# Patient Record
Sex: MALE | Race: WHITE | Employment: PART TIME | URBAN - METROPOLITAN AREA
[De-identification: names, ages, dates, MRNs, and addresses within clinical notes are randomized per-mention and may not be internally consistent; named-entity substitution may affect disease eponyms.]

---
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
## 2019-01-25 ENCOUNTER — DOCUMENTATION ONLY (OUTPATIENT)
Dept: PRIMARY CARE CLINIC | Age: 19
End: 2019-01-25

## 2019-01-25 NOTE — PROGRESS NOTES
22 y.o male, quiet, shy patient of Dr. Kaiden Clay presents to office as a walk-in, insisting on having his Concerta refilled. Patient admittedly has not attempted to have his medication refilled through routine means, phone call, My Chart, etc. Patient states, \"My Mom will strangle me if I don't come home with this. \" Review of last office visit with Dr. Bernardine Duverney reflects that patient was having some issues with medication compliance with Concerta and SSRI, and this was discussed and a plan made his last office visit to improve medication compliance. Discussed with MARY ELLEN Lainez and it was decided that it would be appropriate for patient to have an office visit with a Provider, prior to refilling medications. Advised patient of this and offered to schedule an appointment for Monday, 01/28/19 and patient is agreeable to this. Encouraged patient to request refills prior to the supply being exhausted, giving time for appointments, labs, etc. Patient verbalizes understanding. Encouraged patient to have Mom phone the office with questions or concerns. Patient is pleasant and cooperative. Left patient with PSR to schedule appointment. End of encounter.

## 2019-01-25 NOTE — TELEPHONE ENCOUNTER
Last office visit 12/19/2018  Last med refill has not had scripted from this office. Has not had since 2017. Attempted to contact yesenia at [phone] got mother. Mother gave different number to contact patient. [phone]. Attempted on that number and received message that voice mail box has not been set up yet.    Patient needs appointment before getting this medication

## 2019-01-28 ENCOUNTER — OFFICE VISIT (OUTPATIENT)
Dept: PRIMARY CARE CLINIC | Age: 19
End: 2019-01-28

## 2019-01-28 VITALS
DIASTOLIC BLOOD PRESSURE: 75 MMHG | HEART RATE: 70 BPM | SYSTOLIC BLOOD PRESSURE: 130 MMHG | OXYGEN SATURATION: 98 % | TEMPERATURE: 97.8 F | WEIGHT: 167.8 LBS | RESPIRATION RATE: 16 BRPM | HEIGHT: 73 IN | BODY MASS INDEX: 22.24 KG/M2

## 2019-01-28 DIAGNOSIS — F90.0 ATTENTION DEFICIT HYPERACTIVITY DISORDER (ADHD), PREDOMINANTLY INATTENTIVE TYPE: Primary | ICD-10-CM

## 2019-01-28 DIAGNOSIS — F41.9 ANXIETY AND DEPRESSION: ICD-10-CM

## 2019-01-28 DIAGNOSIS — F32.A ANXIETY AND DEPRESSION: ICD-10-CM

## 2019-01-28 RX ORDER — METHYLPHENIDATE HYDROCHLORIDE 54 MG/1
TABLET ORAL
Qty: 30 TAB | Refills: 0 | Status: SHIPPED | OUTPATIENT
Start: 2019-01-28

## 2019-01-28 NOTE — PROGRESS NOTES
Chief Complaint   Patient presents with    Medication Refill     states that the medication was working and was suppose to come a week ago but was sick.

## 2019-01-28 NOTE — PROGRESS NOTES
[facility]   [address], [address] [facility]  P: [phone]  F: [phone]      Chief Complaint   Patient presents with    Medication Refill     states that the medication was working and was suppose to come a week ago but was sick. Brian Conklin is a 22 y.o. male who presents to clinic for Medication Refill (states that the medication was working and was suppose to come a week ago but was sick. ). HPI:    The patient presents for medication refill for ADHD, hyperactivity type. He's been off methyphenidate for 10 days. He notes poor focus, hyper-activity and requests monthly refill of medication. He is taking Zoloft for anxiety and depression. His mood is stable. Denies SI or HI. Does not request a refill today. He is taking classes at [employer] but is now home. He is working on finding a job.      Patient Active Problem List    Diagnosis    Attention deficit hyperactivity disorder (ADHD), combined type        Past Medical History:   Diagnosis Date    ADHD     ADHD (attention deficit hyperactivity disorder)     Anxiety     Anxiety      Past Surgical History:   Procedure Laterality Date    HX OTHER SURGICAL  03/01/2018    cauterization of nose      Social History     Socioeconomic History    Marital status: SINGLE     Spouse name: Not on file    Number of children: Not on file    Years of education: Not on file    Highest education level: Not on file   Social Needs    Financial resource strain: Not on file    Food insecurity - worry: Not on file    Food insecurity - inability: Not on file   [employer] needs - medical: Not on file   [employer] needs - non-medical: Not on file   Occupational History    Not on file   Tobacco Use    Smoking status: Never Smoker    Smokeless tobacco: Never Used   Substance and Sexual Activity    Alcohol use: No     Frequency: Never    Drug use: No    Sexual activity: Not Currently     Birth control/protection: None   Other Topics Concern    Not on file   Social History Narrative    Not on file     History reviewed. No pertinent family history. No Known Allergies    Current Outpatient Medications   Medication Sig Dispense Refill    methylphenidate ER 54 mg 24 hr tab Take one tablet by mouth daily 30 Tab 0    sertraline (ZOLOFT) 100 mg tablet TAKE 1 TABLET BY MOUTH EVERY DAY 90 Tab 0    fexofenadine-pseudoephedrine (ALLEGRA-D 12 HOUR)  mg Tb12 Take 1 Tab by mouth every twelve (12) hours. The medications were reviewed and updated in the medical record. The past medical history, past surgical history, and family history were reviewed and updated in the medical record. REVIEW OF SYSTEMS   Review of Systems   Constitutional: Negative for malaise/fatigue. HENT: Negative for congestion. Eyes: Negative for blurred vision and pain. Respiratory: Negative for cough and shortness of breath. Cardiovascular: Negative for chest pain and palpitations. Gastrointestinal: Negative for abdominal pain and heartburn. Genitourinary: Negative for frequency and urgency. Musculoskeletal: Negative for joint pain and myalgias. Neurological: Negative for dizziness, tingling, sensory change, weakness and headaches. Psychiatric/Behavioral: Negative for depression, memory loss and substance abuse. PHYSICAL EXAM     Visit Vitals  /75 (BP 1 Location: Right arm, BP Patient Position: Sitting)   Pulse 70   Temp 97.8 °F (36.6 °C) (Oral)   Resp 16   Ht 6' 1\" (1.854 m)   Wt 167 lb 12.8 oz (76.1 kg)   SpO2 98%   BMI 22.14 kg/m²       Physical Exam   Constitutional: He is oriented to person, place, and time and well-developed, well-nourished, and in no distress. /75   HENT:   Head: Normocephalic and atraumatic. Right Ear: External ear normal.   Left Ear: External ear normal.   Cardiovascular: Normal rate, regular rhythm and normal heart sounds.    Pulmonary/Chest: Effort normal and breath sounds normal.   Musculoskeletal: Normal range of motion. He exhibits no edema. Neurological: He is alert and oriented to person, place, and time. Gait normal.   Skin: Skin is warm and dry. Acne to face   Psychiatric: Affect and judgment normal.   Nursing note and vitals reviewed. ASSESSMENT/ PLAN   Diagnoses and all orders for this visit:    Attention deficit hyperactivity disorder (ADHD), predominantly inattentive type  -     methylphenidate ER 54 mg 24 hr tab; Take one tablet by mouth daily, Print, Disp-30 Tab, R-0    Anxiety and depression       - Stable, continue on Zoloft. Follow-up Disposition:  Return in about 4 weeks (around 2/25/2019) for Chronic Care FU. Disclaimer:  Advised patient to call back or return to office if symptoms worsen/change/persist.  Discussed expected course/resolution/complications of diagnosis in detail with patient.     Medication risks/benefits/alternatives discussed with patient. Patient was given an after visit summary which includes diagnoses, current medications, & vitals.      Discussed patient instructions and advised to read to all patient instructions regarding care.      Patient expressed understanding with the diagnosis and plan. This note will not be viewable in [address]     Neelam Geiger NP  1/28/2019        (This document has been electronically signed)

## 2019-01-28 NOTE — PATIENT INSTRUCTIONS
Attention Deficit Hyperactivity Disorder (ADHD) in Adults: Care Instructions  Your Care Instructions    Attention deficit hyperactivity disorder, or ADHD, is a condition that makes it hard to pay attention. So you may have problems when you try to focus, get organized, and finish tasks. It might make you more active than other people. Or you might do things without thinking first.  ADHD is very common. It usually starts in early childhood. Many adults don't realize they have it until their children are diagnosed. Then they become aware of their own symptoms. Doctors don't know what causes ADHD. But it often runs in families. ADHD can be treated with medicines, behavior training, and counseling. Treatment can improve your life. Follow-up care is a key part of your treatment and safety. Be sure to make and go to all appointments, and call your doctor if you are having problems. It's also a good idea to know your test results and keep a list of the medicines you take. How can you care for yourself at home? · Learn all you can about ADHD. This will help you and your family understand it better. · Take your medicines exactly as prescribed. Call your doctor if you think you are having a problem with your medicine. You will get more details on the specific medicines your doctor prescribes. · If you miss a dose of your medicine, do not take an extra dose. · If your doctor suggests counseling, find a counselor you like and trust. Talk openly and honestly. Be willing to make some changes. · Find a support group for adults with ADHD. Talking to others with the same problems can help you feel better. It can also give you ideas about how to best cope with the condition. · Get rid of distractions at your work space. Keep your desk clean. Try not to face a window or busy hallway. · Use files, planners, and other tools to keep you organized. · Limit use of alcohol, and do not use illegal drugs.  People with ADHD tend to become addicted more easily than others. Tell your doctor if you need help to quit. Counseling, support groups, and sometimes medicines can help you stay free of alcohol or drugs. · Get at least 30 minutes of physical activity on most days of the week. Exercise has been shown to help people cope with ADHD. Walking is a good choice. You also may want to do other activities, such as running, swimming, cycling, or playing tennis or team sports. When should you call for help? Watch closely for changes in your health, and be sure to contact your doctor if:    · You feel sad a lot or cry all the time.     · You have trouble sleeping, or you sleep too much.     · You find it hard to concentrate, make decisions, or remember things.     · You change how you normally eat.     · You feel guilty for no reason. Where can you learn more? Go to http://christiano-сергей.info/. Enter B196 in the search box to learn more about \"Attention Deficit Hyperactivity Disorder (ADHD) in Adults: Care Instructions. \"  Current as of: September 11, 2018  Content Version: 11.9  © 4116-0516 [employer]. Care instructions adapted under license by [employer] (which disclaims liability or warranty for this information). If you have questions about a medical condition or this instruction, always ask your healthcare professional. [place] [address] any warranty or liability for your use of this information.

## 2019-03-14 RX ORDER — SERTRALINE HYDROCHLORIDE 50 MG/1
TABLET, FILM COATED ORAL
Qty: 90 TAB | Refills: 0 | Status: SHIPPED | OUTPATIENT
Start: 2019-03-14 | End: 2021-01-29 | Stop reason: ALTCHOICE

## 2020-01-22 ENCOUNTER — OFFICE VISIT (OUTPATIENT)
Dept: PRIMARY CARE CLINIC | Age: 20
End: 2020-01-22

## 2020-01-22 VITALS
RESPIRATION RATE: 16 BRPM | HEART RATE: 91 BPM | OXYGEN SATURATION: 97 % | HEIGHT: 73 IN | DIASTOLIC BLOOD PRESSURE: 79 MMHG | SYSTOLIC BLOOD PRESSURE: 112 MMHG | BODY MASS INDEX: 22.14 KG/M2

## 2020-01-22 DIAGNOSIS — R04.0 EPISTAXIS: Primary | ICD-10-CM

## 2020-01-22 RX ORDER — DULOXETIN HYDROCHLORIDE 20 MG/1
CAPSULE, DELAYED RELEASE ORAL
COMMUNITY
Start: 2020-01-14 | End: 2021-05-12 | Stop reason: ALTCHOICE

## 2020-01-22 NOTE — PROGRESS NOTES
Chief Complaint   Patient presents with    Epistaxis     started about 3 20 and mother states that it was sudden and alot. paitnet has had hx of nose bleeds and being carterised.

## 2020-01-22 NOTE — LETTER
NOTIFICATION OF RETURN TO WORK / SCHOOL 
 
1/22/2020 Mr. Kit Bunch 
[address] [address] To Whom It May Concern: 
 
Kit Bunch was under the care of [facility]. Please excuse him from work on 1/23/20. If there are questions or concerns please have the patient contact our office. Sincerely, Jaspreet Castle NP

## 2020-01-22 NOTE — PATIENT INSTRUCTIONS
Nosebleeds: Care Instructions  Your Care Instructions    Nosebleeds are common, especially if you have colds or allergies. Many things can cause a nosebleed. Some nosebleeds stop on their own with pressure. Others need packing. Some get cauterized (sealed). If you have gauze or other packing materials in your nose, you will need to follow up with your doctor to have the packing removed. You may need more treatment if you get nosebleeds a lot. The doctor has checked you carefully, but problems can develop later. If you notice any problems or new symptoms, get medical treatment right away. Follow-up care is a key part of your treatment and safety. Be sure to make and go to all appointments, and call your doctor if you are having problems. It's also a good idea to know your test results and keep a list of the medicines you take. How can you care for yourself at home? · If you get another nosebleed:  ? Sit up and tilt your head slightly forward. This keeps blood from going down your throat. ? Use your thumb and index finger to pinch your nose shut for 10 minutes. Use a clock. Do not check to see if the bleeding has stopped before the 10 minutes are up. If the bleeding has not stopped, pinch your nose shut for another 10 minutes. ? When the bleeding has stopped, try not to pick, rub, or blow your nose for 12 hours. Avoiding these things helps keep your nose from bleeding again. · If your doctor prescribed antibiotics, take them as directed. Do not stop taking them just because you feel better. You need to take the full course of antibiotics. To prevent nosebleeds  · Do not blow your nose too hard. · Try not to lift or strain after a nosebleed. · Raise your head on a pillow while you sleep. · Put a thin layer of a saline- or water-based nasal gel, such as NasoGel, inside your nose. Put it on the septum, which divides your nostrils. This will prevent dryness that can cause nosebleeds.   · Use a vaporizer or humidifier to add moisture to your bedroom. Follow the directions for cleaning the machine. · Do not use aspirin, ibuprofen (Advil, Motrin), or naproxen (Aleve) for 36 to 48 hours after a nosebleed unless your doctor tells you to. You can use acetaminophen (Tylenol) for pain relief. · Talk to your doctor about stopping any other medicines you are taking. Some medicines may make you more likely to get a nosebleed. · Do not use cold medicines or nasal sprays without first talking to your doctor. They can make your nose dry. When should you call for help? Call 911 anytime you think you may need emergency care. For example, call if:    · You passed out (lost consciousness).    Call your doctor now or seek immediate medical care if:    · You get another nosebleed and your nose is still bleeding after you have applied pressure 3 times for 10 minutes each time (30 minutes total).     · There is a lot of blood running down the back of your throat even after you pinch your nose and tilt your head forward.     · You have a fever.     · You have sinus pain.    Watch closely for changes in your health, and be sure to contact your doctor if:    · You get nosebleeds often, even if they stop.     · You do not get better as expected. Where can you learn more? Go to http://christiano-сергей.info/. Enter S156 in the search box to learn more about \"Nosebleeds: Care Instructions. \"  Current as of: June 26, 2019  Content Version: 12.2  © 7938-6096 [employer]. Care instructions adapted under license by [employer] (which disclaims liability or warranty for this information). If you have questions about a medical condition or this instruction, always ask your healthcare professional. [address] any warranty or liability for your use of this information.

## 2020-01-23 NOTE — PROGRESS NOTES
[facility]   [address], [address] [facility]  P: [phone]  F: [phone]      Chief Complaint   Patient presents with    Epistaxis     started about 3 20 and mother states that it was sudden and alot. jose has had hx of nose bleeds and being carterised. Em Weeks is a 20 y.o. male who presents to clinic for Epistaxis (started about 3 20 and mother states that it was sudden and alot. aartitcristina has had hx of nose bleeds and being carterised. ). HPI:    Nataliya Morales is a 71-year-old male who presents today for nosebleed. The bleeding has currently stopped. The patient states his nose was bleeding for roughly 1 hour. The patient has a history of prior nosebleeds, that required cauterization by ENT. The patient's mom states that the patient does blow his nose often, and forcefully. The patient does have a history of seasonal allergies. Patient denies any blood thinner use or trauma to the nose.     Patient Active Problem List    Diagnosis    Attention deficit hyperactivity disorder (ADHD), combined type          Past Medical History:   Diagnosis Date    ADHD     ADHD (attention deficit hyperactivity disorder)     Anxiety     Anxiety      Past Surgical History:   Procedure Laterality Date    HX OTHER SURGICAL  03/01/2018    cauterization of nose      Social History     Socioeconomic History    Marital status: SINGLE     Spouse name: Not on file    Number of children: Not on file    Years of education: Not on file    Highest education level: Not on file   Occupational History    Not on file   Social Needs    Financial resource strain: Not on file    Food insecurity:     Worry: Not on file     Inability: Not on file    Transportation needs:     Medical: Not on file     Non-medical: Not on file   Tobacco Use    Smoking status: Never Smoker    Smokeless tobacco: Never Used   Substance and Sexual Activity    Alcohol use: No     Frequency: Never    Drug use: No    Sexual activity: Not Currently     Birth control/protection: None   Lifestyle    Physical activity:     Days per week: Not on file     Minutes per session: Not on file    Stress: Not on file   Relationships    Social connections:     Talks on phone: Not on file     Gets together: Not on file     Attends Druze service: Not on file     Active member of club or organization: Not on file     Attends meetings of clubs or organizations: Not on file     Relationship status: Not on file    Intimate partner violence:     Fear of current or ex partner: Not on file     Emotionally abused: Not on file     Physically abused: Not on file     Forced sexual activity: Not on file   Other Topics Concern    Not on file   Social History Narrative    Not on file     History reviewed. No pertinent family history. No Known Allergies    Current Outpatient Medications   Medication Sig Dispense Refill    sodium chloride-sod bicarb-hyaluronate sodium-aloe 0.9 % nasal gel Apply  to affected area two (2) times a day. 30 g 0    DULoxetine (CYMBALTA) 20 mg capsule       methylphenidate ER 54 mg 24 hr tab Take one tablet by mouth daily 30 Tab 0    sertraline (ZOLOFT) 100 mg tablet TAKE 1 TABLET BY MOUTH EVERY DAY 90 Tab 0    sertraline (ZOLOFT) 50 mg tablet TAKE 1 TABLET BY MOUTH EVERY DAY 90 Tab 0    fexofenadine-pseudoephedrine (ALLEGRA-D 12 HOUR)  mg Tb12 Take 1 Tab by mouth every twelve (12) hours. The medications were reviewed and updated in the medical record. The past medical history, past surgical history, and family history were reviewed and updated in the medical record. REVIEW OF SYSTEMS   Review of Systems   HENT:        +epistaxis    All other systems reviewed and are negative.         PHYSICAL EXAM     Visit Vitals  /79 (BP 1 Location: Left arm, BP Patient Position: Sitting)   Pulse 91   Resp 16   Ht 6' 1\" (1.854 m)   SpO2 97%   BMI 22.14 kg/m²       Physical Exam  Vitals signs and nursing note reviewed. HENT:      Head: Normocephalic and atraumatic. Right Ear: External ear normal.      Left Ear: External ear normal.      Nose:      Right Nostril: Epistaxis (hemostasis acheived ) present. Left Nostril: No epistaxis. Cardiovascular:      Rate and Rhythm: Normal rate and regular rhythm. Heart sounds: Normal heart sounds. Pulmonary:      Effort: Pulmonary effort is normal.      Breath sounds: Normal breath sounds. Musculoskeletal: Normal range of motion. Skin:     General: Skin is warm and dry. Neurological:      Mental Status: He is alert and oriented to person, place, and time. Gait: Gait is intact. Psychiatric:         Mood and Affect: Affect normal.         Judgment: Judgment normal.       ASSESSMENT/ PLAN   Diagnoses and all orders for this visit:    1. Epistaxis  -     sodium chloride-sod bicarb-hyaluronate sodium-aloe 0.9 % nasal gel; Apply  to affected area two (2) times a day. -Provided AVS instructions for nosebleeds. Advised patient to not blow his nose forcefully, not to use aspirin/NSAIDs for 2 days, should not lift heavy objects for the next 24 hours. Provided work note for patient.  -If becoming a recurrent issue should follow-up with ENT for possible cauterization. Disclaimer:  Advised patient to call back or return to office if symptoms worsen/change/persist.  Discussed expected course/resolution/complications of diagnosis in detail with patient.     Medication risks/benefits/alternatives discussed with patient. Patient was given an after visit summary which includes diagnoses, current medications, & vitals.      Discussed patient instructions and advised to read to all patient instructions regarding care.      Patient expressed understanding with the diagnosis and plan. This note will not be viewable in [address]         Allyson Wasserman NP  1/22/2020        (This document has been electronically signed)

## 2020-02-24 NOTE — TELEPHONE ENCOUNTER
LOV 07/25/18  Last refill 07/01/18 This is an already known fracture, required 1 week repeat xray per ortho during recent hospitalization   She is using immobilizer and has appt with ortho in 2 days Verified Results  (1) HEP B CORE AB, IgM [id number] 07:26AM Kiersten Santos Order Number: [id number]     Test Name Result Flag Reference   HEPATITIS B CORE IGM ANTIBODY Non-reactive  Non-reactive     (1) HEP B SURFACE ANTIGEN 07Sep2016 07:26AM Kiersten Santos Order Number: [id number]     Test Name Result Flag Reference   HEPATITIS B SURFACE ANTIGEN Non-reactive  Non-reactive, NonReactive - Confirmed     (1) HEP C ANTIBODY 07Sep2016 07:26AM Kiersten Santos Order Number: [id number]     Test Name Result Flag Reference   HEPATITIS C ANTIBODY Non-reactive  Non-reactive

## 2020-05-29 ENCOUNTER — VIRTUAL VISIT (OUTPATIENT)
Dept: PRIMARY CARE CLINIC | Age: 20
End: 2020-05-29

## 2020-05-29 DIAGNOSIS — J06.9 VIRAL UPPER RESPIRATORY TRACT INFECTION: Primary | ICD-10-CM

## 2020-05-29 DIAGNOSIS — M79.10 MYALGIA: ICD-10-CM

## 2020-05-29 NOTE — PROGRESS NOTES
I was in the office while conducting this encounter. Consent:  He and/or his healthcare decision maker is aware that this patient-initiated Telehealth encounter is a billable service, with coverage as determined by his insurance carrier. He is aware that he may receive a bill and has provided verbal consent to proceed: Yes    This virtual visit was conducted via [employer]. Pursuant to the emergency declaration under the [facility] [place] [address] waiver authority and the [employer] Act, this Virtual  Visit was conducted to reduce the patient's risk of exposure to COVID-19 and provide continuity of care for an established patient. Services were provided through a video synchronous discussion virtually to substitute for in-person clinic visit. Due to this being a TeleHealth evaluation, many elements of the physical examination are unable to be assessed. HPI:   Aviva Fowler is a 20y.o. year old male who presents today for  body aches & fatigue for last 1 week. Cough for last 2 days. He works at [employer]. No one around him was sick. He took Robitussin & Ibuprofen which helped. No fever but has night sweats. Has been having trouble sleeping which has been getting worse. Feeling so weak and can`t get up from the bed.    .    Review of Systems   Constitutional: positive for chills and malaise, negative for fevers  Eyes: negative for visual disturbance, redness and icterus  Ears, nose, mouth, throat, and face: negative for ear drainage and sore mouth  Respiratory: positive for cough, negative for wheezing or dyspnea on exertion  Cardiovascular: negative for chest pain, palpitations  Musculoskeletal:positive for myalgias, negative for neck pain and back pain  Neurological: negative for dizziness and vertigo  Behavioral/Psych: negative for aggressive behavior and anxiety    Physical Examination   General appearance: alert, cooperative, no distress, appears stated age  Eyes:  conjunctivae and sclerae normal  Throat: Lips, mucosa, and tongue normal. Teeth and gums normal  Lungs: No distress , effort normal   Extremities: No cyanosis or edema. Assessment/Plan   Diagnoses and all orders for this visit:    Viral upper respiratory tract infection  Told him it is just viral infection , should stay hydrated & eat healthy. He is interested getting COVID test done. Numbers given to get it checked. Myalgia  Tylenol as needed recommended .        Avinash Couch MD  5/29/2020  12:29 PM

## 2020-08-07 ENCOUNTER — OFFICE VISIT (OUTPATIENT)
Dept: PRIMARY CARE CLINIC | Age: 20
End: 2020-08-07
Payer: COMMERCIAL

## 2020-08-07 VITALS
DIASTOLIC BLOOD PRESSURE: 76 MMHG | RESPIRATION RATE: 18 BRPM | SYSTOLIC BLOOD PRESSURE: 111 MMHG | BODY MASS INDEX: 29.16 KG/M2 | TEMPERATURE: 97.3 F | HEART RATE: 88 BPM | OXYGEN SATURATION: 95 % | WEIGHT: 220 LBS | HEIGHT: 73 IN

## 2020-08-07 DIAGNOSIS — L03.012 PARONYCHIA OF FINGER OF LEFT HAND: Primary | ICD-10-CM

## 2020-08-07 DIAGNOSIS — Z23 NEED FOR DIPHTHERIA-TETANUS-PERTUSSIS (TDAP) VACCINE: ICD-10-CM

## 2020-08-07 DIAGNOSIS — S69.92XA INJURY OF FINGER OF LEFT HAND, INITIAL ENCOUNTER: ICD-10-CM

## 2020-08-07 PROCEDURE — 99213 OFFICE O/P EST LOW 20 MIN: CPT | Performed by: INTERNAL MEDICINE

## 2020-08-07 PROCEDURE — 90471 IMMUNIZATION ADMIN: CPT | Performed by: INTERNAL MEDICINE

## 2020-08-07 PROCEDURE — 90715 TDAP VACCINE 7 YRS/> IM: CPT

## 2020-08-07 RX ORDER — CLINDAMYCIN HYDROCHLORIDE 300 MG/1
300 CAPSULE ORAL 3 TIMES DAILY
Qty: 30 CAP | Refills: 0 | Status: SHIPPED | OUTPATIENT
Start: 2020-08-07 | End: 2020-08-17

## 2020-08-07 NOTE — PROGRESS NOTES
Chief Complaint   Patient presents with    Laceration     left ring finger approx 2weeks ago.  non healing

## 2020-08-07 NOTE — PROGRESS NOTES
Written by Mya Goldberg, as dictated by Dr. Maryjane English MD.    Vanessa Batres is a 20 y.o. male. HPI  Pt presents today to discuss a laceration on the 4th finger of his L hand. He does not remember what he did to cut his finger, but it occurred about 2 weeks ago. He thought it will get better but it`s getting worse. Has noticed swelling & pain on touch is getting worse. His last Tdap was in 2010. He currently works at [employer]. Patient Active Problem List   Diagnosis Code    Attention deficit hyperactivity disorder (ADHD), combined type F90.2        Current Outpatient Medications on File Prior to Visit   Medication Sig Dispense Refill    DULoxetine (CYMBALTA) 20 mg capsule       sertraline (ZOLOFT) 100 mg tablet TAKE 1 TABLET BY MOUTH EVERY DAY 90 Tab 0    fexofenadine-pseudoephedrine (ALLEGRA-D 12 HOUR)  mg Tb12 Take 1 Tab by mouth every twelve (12) hours.  sodium chloride-sod bicarb-hyaluronate sodium-aloe 0.9 % nasal gel Apply  to affected area two (2) times a day. 30 g 0    sertraline (ZOLOFT) 50 mg tablet TAKE 1 TABLET BY MOUTH EVERY DAY 90 Tab 0    methylphenidate ER 54 mg 24 hr tab Take one tablet by mouth daily 30 Tab 0     No current facility-administered medications on file prior to visit. No Known Allergies    Past Medical History:   Diagnosis Date    ADHD     ADHD (attention deficit hyperactivity disorder)     Anxiety     Anxiety        Past Surgical History:   Procedure Laterality Date    HX OTHER SURGICAL  03/01/2018    cauterization of nose        No family history on file.     Social History     Socioeconomic History    Marital status: SINGLE     Spouse name: Not on file    Number of children: Not on file    Years of education: Not on file    Highest education level: Not on file   Occupational History    Not on file   Social Needs    Financial resource strain: Not on file    Food insecurity     Worry: Not on file Inability: Not on file    Transportation needs     Medical: Not on file     Non-medical: Not on file   Tobacco Use    Smoking status: Never Smoker    Smokeless tobacco: Never Used   Substance and Sexual Activity    Alcohol use: No     Frequency: Never    Drug use: No    Sexual activity: Not Currently     Birth control/protection: None   Lifestyle    Physical activity     Days per week: Not on file     Minutes per session: Not on file    Stress: Not on file   Relationships    Social connections     Talks on phone: Not on file     Gets together: Not on file     Attends Rastafari service: Not on file     Active member of club or organization: Not on file     Attends meetings of clubs or organizations: Not on file     Relationship status: Not on file    Intimate partner violence     Fear of current or ex partner: Not on file     Emotionally abused: Not on file     Physically abused: Not on file     Forced sexual activity: Not on file   Other Topics Concern    Not on file   Social History Narrative    Not on file       No visits with results within 3 Month(s) from this visit.    Latest known visit with results is:   Admission on 11/18/2017, Discharged on 11/18/2017   Component Date Value Ref Range Status    Ventricular Rate 11/18/2017 102  BPM Final    Atrial Rate 11/18/2017 102  BPM Final    P-R Interval 11/18/2017 152  ms Final    QRS Duration 11/18/2017 90  ms Final    Q-T Interval 11/18/2017 322  ms Final    QTC Calculation (Bezet) 11/18/2017 419  ms Final    Calculated P Axis 11/18/2017 55  degrees Final    Calculated R Axis 11/18/2017 79  degrees Final    Calculated T Axis 11/18/2017 37  degrees Final    Diagnosis 11/18/2017    Final                    Value:Sinus tachycardia  When compared with ECG of 12-JUL-2010 09:52,  Rate is increased, otherwise no significant change  Confirmed by Rodney Hennessy M.D., Angela Dietrich ([id number]) on 11/19/2017 3:06:36 PM      Sodium 11/18/2017 141  136 - 145 mmol/L Final    Potassium 11/18/2017 3.6  3.5 - 5.1 mmol/L Final    Chloride 11/18/2017 107  97 - 108 mmol/L Final    CO2 11/18/2017 29  21 - 32 mmol/L Final    Anion gap 11/18/2017 5  5 - 15 mmol/L Final    Glucose 11/18/2017 75  65 - 100 mg/dL Final    BUN 11/18/2017 16  6 - 20 MG/DL Final    Creatinine 11/18/2017 0.90  0.70 - 1.30 MG/DL Final    BUN/Creatinine ratio 11/18/2017 18  12 - 20   Final    GFR est AA 11/18/2017 >60  >60 ml/min/1.73m2 Final    GFR est non-AA 11/18/2017 >60  >60 ml/min/1.73m2 Final    Comment: Estimated GFR is calculated using the IDMS-traceable Modification of Diet in Renal Disease (MDRD) Study equation, reported for both  Americans (GFRAA) and non- Americans (GFRNA), and normalized to 1.73m2 body surface area. The physician must decide which value applies to the patient. The MDRD study equation should only be used in individuals age 25 or older. It has not been validated for the following: pregnant women, patients with serious comorbid conditions, or on certain medications, or persons with extremes of body size, muscle mass, or nutritional status.  Calcium 11/18/2017 9.4  8.5 - 10.1 MG/DL Final    Bilirubin, total 11/18/2017 0.3  0.2 - 1.0 MG/DL Final    ALT (SGPT) 11/18/2017 23  12 - 78 U/L Final    AST (SGOT) 11/18/2017 10* 15 - 37 U/L Final    Alk. phosphatase 11/18/2017 101  60 - 330 U/L Final    Protein, total 11/18/2017 7.4  6.4 - 8.2 g/dL Final    Albumin 11/18/2017 4.1  3.5 - 5.0 g/dL Final    Globulin 11/18/2017 3.3  2.0 - 4.0 g/dL Final    A-G Ratio 11/18/2017 1.2  1.1 - 2.2   Final    CK 11/18/2017 127  39 - 308 U/L Final    Acetaminophen level 11/18/2017 <2* 10 - 30 ug/mL Final    Acetaminophen greater than 150 µg/mL at 4 hours after ingestion and 50 µg/mL at 12 hours after ingestion is often associated with toxic reactions.     Salicylate level 47/83/1931 <1.7* 2.8 - 20.0 MG/DL Final    Comment: Analgesic: 2-10 mg/dL  Anti-inflammatory: 10-30 mg/dl  Toxic: >30 mg/dl      ALCOHOL(ETHYL),SERUM 11/18/2017 <10  <10 MG/DL Final    Color 11/18/2017 YELLOW/STRAW    Final    Color Reference Range: Straw, Yellow or Dark Yellow    Appearance 11/18/2017 CLOUDY* CLEAR   Final    Specific gravity 11/18/2017 1.019  1.003 - 1.030   Final    pH (UA) 11/18/2017 7.5  5.0 - 8.0   Final    Protein 11/18/2017 NEGATIVE   NEG mg/dL Final    Glucose 11/18/2017 NEGATIVE   NEG mg/dL Final    Ketone 11/18/2017 NEGATIVE   NEG mg/dL Final    Bilirubin 11/18/2017 NEGATIVE   NEG   Final    Blood 11/18/2017 NEGATIVE   NEG   Final    Urobilinogen 11/18/2017 0.2  0.2 - 1.0 EU/dL Final    Nitrites 11/18/2017 NEGATIVE   NEG   Final    Leukocyte Esterase 11/18/2017 NEGATIVE   NEG   Final    AMPHETAMINES 11/18/2017 NEGATIVE   NEG   Final    BARBITURATES 11/18/2017 NEGATIVE   NEG   Final    BENZODIAZEPINES 11/18/2017 NEGATIVE   NEG   Final    COCAINE 11/18/2017 NEGATIVE   NEG   Final    METHADONE 11/18/2017 NEGATIVE   NEG   Final    OPIATES 11/18/2017 NEGATIVE   NEG   Final    PCP(PHENCYCLIDINE) 11/18/2017 NEGATIVE   NEG   Final    THC (TH-CANNABINOL) 11/18/2017 NEGATIVE   NEG   Final    Drug screen comment 11/18/2017 (NOTE)   Final    Comment: This test is a screen for drugs of abuse in a medical setting only  (i.e., they are unconfirmed results and as such must not be used for  non-medical purposes e.g., employment testing, legal testing). Due to its inherent nature, false positive (FP) and false negative (FN)  results may be obtained. Therefore, if necessary for medical care,  recommend confirmation of positive findings by GC/MS.     The cutoff values (i.e., the level at which this screening test  becomes positive for a given drug group) are:    Amphetamine/Methamphetamine: 300 ng/mL  Barbiturates:                200 ng/mL  Benzodiazepines:             200 ng/mL  Cocaine:                     150 ng/mL  Methadone:                   300 ng/mL  Opiates: 300 ng/mL  Phencyclidine, PCP:           25 ng/mL  Marijuana, THC:               50 ng/mL    This screening test can identify the presence of the following drugs  when above the cutoff value: See list posted on the intranet. It can  be viewed by selecting i                           n sequence the following: From the [employer]IS home  page, select: Shannan; Sharon Montes De Oca; Laboratory Department;  MARIAH Valverde; Laboratory Directory of Services; then List of  Detectable Drugs for Automated Urine Drug Screen. Review of Systems   Constitutional: Negative for malaise/fatigue and weight loss. HENT: Negative for congestion and sore throat. Eyes: Negative for blurred vision. Respiratory: Negative for cough and shortness of breath. Cardiovascular: Negative for chest pain and leg swelling. Gastrointestinal: Negative for constipation and heartburn. Genitourinary: Negative for frequency and urgency. Musculoskeletal: Positive for joint pain (L 4th finger). Negative for back pain and myalgias. Skin:        +laceration on 4th finger of L hand   Neurological: Negative for dizziness and headaches. Psychiatric/Behavioral: Negative for depression. The patient is not nervous/anxious and does not have insomnia. Visit Vitals  /76 (BP 1 Location: Left arm, BP Patient Position: Sitting)   Pulse 88   Temp 97.3 °F (36.3 °C) (Temporal)   Resp 18   Ht 6' 1\" (1.854 m)   Wt 220 lb (99.8 kg)   SpO2 95%   BMI 29.03 kg/m²     Physical Exam  Vitals signs and nursing note reviewed. Constitutional:       General: He is not in acute distress. Appearance: He is well-developed and well-groomed. He is not diaphoretic. HENT:      Right Ear: External ear normal.      Left Ear: External ear normal.   Eyes:      General: No scleral icterus. Right eye: No discharge. Left eye: No discharge. Extraocular Movements: Extraocular movements intact.       Conjunctiva/sclera: Conjunctivae normal.   Neck:      Musculoskeletal: Normal range of motion and neck supple. Cardiovascular:      Rate and Rhythm: Normal rate and regular rhythm. Pulmonary:      Effort: Pulmonary effort is normal.      Breath sounds: Normal breath sounds. No wheezing. Abdominal:      General: Bowel sounds are normal.      Palpations: Abdomen is soft. Tenderness: There is no abdominal tenderness. Musculoskeletal:        Hands:    Lymphadenopathy:      Cervical: No cervical adenopathy. Skin:     Findings: Laceration (beside fingernail; 4th finger L hand; yellow but not oozing) present. Neurological:      Mental Status: He is alert and oriented to person, place, and time. Psychiatric:         Mood and Affect: Mood and affect normal.         Behavior: Behavior normal.       ASSESSMENT and PLAN    ICD-10-CM ICD-9-CM    1. Paronychia of finger of left hand  L03.012 681.02 clindamycin (CLEOCIN) 300 mg capsule sent to pharmacy. Potential side effects were discussed. I prescribed clindamycin and instructed pt to take it TID with food and to start taking a probiotic every day. He should contact the office if he is having digestive problems from this medication. 2. Injury of finger of left hand, initial encounter  S69. 92XA 959.5 Told him should get Tetanus shot as his is  . 3. Need for diphtheria-tetanus-pertussis (Tdap) vaccine  Z23 V06.1 TETANUS, DIPHTHERIA TOXOIDS AND ACELLULAR PERTUSSIS VACCINE (TDAP), IN INDIVIDS. >=7, IM administered in office. Pt tolerated it well. This plan was reviewed with the patient and patient agrees. All questions were answered. This scribe documentation was reviewed by me and accurately reflects the examination and decisions made by me. This note will not be viewable in [address]

## 2021-01-29 ENCOUNTER — OFFICE VISIT (OUTPATIENT)
Dept: PRIMARY CARE CLINIC | Age: 21
End: 2021-01-29
Payer: COMMERCIAL

## 2021-01-29 VITALS
WEIGHT: 232.8 LBS | BODY MASS INDEX: 30.85 KG/M2 | HEART RATE: 81 BPM | HEIGHT: 73 IN | TEMPERATURE: 98.4 F | SYSTOLIC BLOOD PRESSURE: 116 MMHG | OXYGEN SATURATION: 96 % | RESPIRATION RATE: 16 BRPM | DIASTOLIC BLOOD PRESSURE: 79 MMHG

## 2021-01-29 DIAGNOSIS — F41.9 ANXIETY: ICD-10-CM

## 2021-01-29 DIAGNOSIS — R11.0 NAUSEA: Primary | ICD-10-CM

## 2021-01-29 DIAGNOSIS — K58.2 IRRITABLE BOWEL SYNDROME WITH BOTH CONSTIPATION AND DIARRHEA: ICD-10-CM

## 2021-01-29 DIAGNOSIS — K21.9 GASTROESOPHAGEAL REFLUX DISEASE WITHOUT ESOPHAGITIS: ICD-10-CM

## 2021-01-29 PROCEDURE — 99213 OFFICE O/P EST LOW 20 MIN: CPT | Performed by: INTERNAL MEDICINE

## 2021-01-29 RX ORDER — DICYCLOMINE HYDROCHLORIDE 10 MG/1
10 CAPSULE ORAL 3 TIMES DAILY
Qty: 45 CAP | Refills: 0 | Status: SHIPPED | OUTPATIENT
Start: 2021-01-29 | End: 2021-02-13

## 2021-01-29 NOTE — PROGRESS NOTES
Written by Jordon Casarez, as dictated by Dr. Laila Ennis MD.    Ulises Anderson (: 2000) is a 23 y.o. male, established patient, here for evaluation of the following chief complaint(s): Other (nausea is spiked yesterday more than usual and it feels like a pit in the stomach and felt like felt like it was doing flips. took pepto bismal and karopectate)     SUBJECTIVE/OBJECTIVE:  HPI  Pt presents today to request a referral to gastroenterology. He has had digestive issues all his life, but recently he has had a lot of nausea and this inspired him to start pursuing care from a specialist. He also gets intermittent diarrhea and constipation. He can keep food down, but he feels like his stomach is \"constantly doing flips\" and it can become very painful. Yesterday he had to leave work because of his sx. He is also belching and struggling with acid reflux. He takes omeprazole for this but it does not help very much. He is doing intensive in-home therapy for his anxiety and continues on Cymbalta and methylphenidate ER every day. Patient Active Problem List   Diagnosis Code    Attention deficit hyperactivity disorder (ADHD), combined type F90.2        Current Outpatient Medications on File Prior to Visit   Medication Sig Dispense Refill    sodium chloride-sod bicarb-hyaluronate sodium-aloe 0.9 % nasal gel Apply  to affected area two (2) times a day. 30 g 0    DULoxetine (CYMBALTA) 20 mg capsule       methylphenidate ER 54 mg 24 hr tab Take one tablet by mouth daily 30 Tab 0    [DISCONTINUED] sertraline (ZOLOFT) 50 mg tablet TAKE 1 TABLET BY MOUTH EVERY DAY 90 Tab 0    [DISCONTINUED] sertraline (ZOLOFT) 100 mg tablet TAKE 1 TABLET BY MOUTH EVERY DAY 90 Tab 0    fexofenadine-pseudoephedrine (ALLEGRA-D 12 HOUR)  mg Tb12 Take 1 Tab by mouth every twelve (12) hours. No current facility-administered medications on file prior to visit.         No Known Allergies    Past Medical History: Diagnosis Date    ADHD     ADHD (attention deficit hyperactivity disorder)     Anxiety     Anxiety        Past Surgical History:   Procedure Laterality Date    HX OTHER SURGICAL  03/01/2018    cauterization of nose        History reviewed. No pertinent family history. Social History     Socioeconomic History    Marital status: SINGLE     Spouse name: Not on file    Number of children: Not on file    Years of education: Not on file    Highest education level: Not on file   Occupational History    Not on file   Social Needs    Financial resource strain: Not on file    Food insecurity     Worry: Not on file     Inability: Not on file    Transportation needs     Medical: Not on file     Non-medical: Not on file   Tobacco Use    Smoking status: Never Smoker    Smokeless tobacco: Never Used   Substance and Sexual Activity    Alcohol use: No     Frequency: Never    Drug use: No    Sexual activity: Not Currently     Birth control/protection: None   Lifestyle    Physical activity     Days per week: Not on file     Minutes per session: Not on file    Stress: Not on file   Relationships    Social connections     Talks on phone: Not on file     Gets together: Not on file     Attends Mosque service: Not on file     Active member of club or organization: Not on file     Attends meetings of clubs or organizations: Not on file     Relationship status: Not on file    Intimate partner violence     Fear of current or ex partner: Not on file     Emotionally abused: Not on file     Physically abused: Not on file     Forced sexual activity: Not on file   Other Topics Concern    Not on file   Social History Narrative    Not on file       No visits with results within 3 Month(s) from this visit.    Latest known visit with results is:   Admission on 11/18/2017, Discharged on 11/18/2017   Component Date Value Ref Range Status    Ventricular Rate 11/18/2017 102  BPM Final    Atrial Rate 11/18/2017 102  BPM Final    P-R Interval 11/18/2017 152  ms Final    QRS Duration 11/18/2017 90  ms Final    Q-T Interval 11/18/2017 322  ms Final    QTC Calculation (Bezet) 11/18/2017 419  ms Final    Calculated P Axis 11/18/2017 55  degrees Final    Calculated R Axis 11/18/2017 79  degrees Final    Calculated T Axis 11/18/2017 37  degrees Final    Diagnosis 11/18/2017    Final                    Value:Sinus tachycardia  When compared with ECG of 12-JUL-2010 09:52,  Rate is increased, otherwise no significant change  Confirmed by Jenna Del Cid M.D., [place] ([id number]) on 11/19/2017 3:06:36 PM      Sodium 11/18/2017 141  136 - 145 mmol/L Final    Potassium 11/18/2017 3.6  3.5 - 5.1 mmol/L Final    Chloride 11/18/2017 107  97 - 108 mmol/L Final    CO2 11/18/2017 29  21 - 32 mmol/L Final    Anion gap 11/18/2017 5  5 - 15 mmol/L Final    Glucose 11/18/2017 75  65 - 100 mg/dL Final    BUN 11/18/2017 16  6 - 20 MG/DL Final    Creatinine 11/18/2017 0.90  0.70 - 1.30 MG/DL Final    BUN/Creatinine ratio 11/18/2017 18  12 - 20   Final    GFR est AA 11/18/2017 >60  >60 ml/min/1.73m2 Final    GFR est non-AA 11/18/2017 >60  >60 ml/min/1.73m2 Final    Comment: Estimated GFR is calculated using the IDMS-traceable Modification of Diet in Renal Disease (MDRD) Study equation, reported for both  Americans (GFRAA) and non- Americans (GFRNA), and normalized to 1.73m2 body surface area. The physician must decide which value applies to the patient. The MDRD study equation should only be used in individuals age 25 or older. It has not been validated for the following: pregnant women, patients with serious comorbid conditions, or on certain medications, or persons with extremes of body size, muscle mass, or nutritional status.       Calcium 11/18/2017 9.4  8.5 - 10.1 MG/DL Final    Bilirubin, total 11/18/2017 0.3  0.2 - 1.0 MG/DL Final    ALT (SGPT) 11/18/2017 23  12 - 78 U/L Final    AST (SGOT) 11/18/2017 10* 15 - 37 U/L Final    Alk. phosphatase 11/18/2017 101  60 - 330 U/L Final    Protein, total 11/18/2017 7.4  6.4 - 8.2 g/dL Final    Albumin 11/18/2017 4.1  3.5 - 5.0 g/dL Final    Globulin 11/18/2017 3.3  2.0 - 4.0 g/dL Final    A-G Ratio 11/18/2017 1.2  1.1 - 2.2   Final    CK 11/18/2017 127  39 - 308 U/L Final    Acetaminophen level 11/18/2017 <2* 10 - 30 ug/mL Final    Acetaminophen greater than 150 µg/mL at 4 hours after ingestion and 50 µg/mL at 12 hours after ingestion is often associated with toxic reactions.  Salicylate level 51/37/1422 <1.7* 2.8 - 20.0 MG/DL Final    Comment: Analgesic: 2-10 mg/dL  Anti-inflammatory: 10-30 mg/dl  Toxic: >30 mg/dl      ALCOHOL(ETHYL),SERUM 11/18/2017 <10  <10 MG/DL Final    Color 11/18/2017 YELLOW/STRAW    Final    Color Reference Range: Straw, Yellow or Dark Yellow    Appearance 11/18/2017 CLOUDY* CLEAR   Final    Specific gravity 11/18/2017 1.019  1.003 - 1.030   Final    pH (UA) 11/18/2017 7.5  5.0 - 8.0   Final    Protein 11/18/2017 NEGATIVE   NEG mg/dL Final    Glucose 11/18/2017 NEGATIVE   NEG mg/dL Final    Ketone 11/18/2017 NEGATIVE   NEG mg/dL Final    Bilirubin 11/18/2017 NEGATIVE   NEG   Final    Blood 11/18/2017 NEGATIVE   NEG   Final    Urobilinogen 11/18/2017 0.2  0.2 - 1.0 EU/dL Final    Nitrites 11/18/2017 NEGATIVE   NEG   Final    Leukocyte Esterase 11/18/2017 NEGATIVE   NEG   Final    AMPHETAMINES 11/18/2017 NEGATIVE   NEG   Final    BARBITURATES 11/18/2017 NEGATIVE   NEG   Final    BENZODIAZEPINES 11/18/2017 NEGATIVE   NEG   Final    COCAINE 11/18/2017 NEGATIVE   NEG   Final    METHADONE 11/18/2017 NEGATIVE   NEG   Final    OPIATES 11/18/2017 NEGATIVE   NEG   Final    PCP(PHENCYCLIDINE) 11/18/2017 NEGATIVE   NEG   Final    THC (TH-CANNABINOL) 11/18/2017 NEGATIVE   NEG   Final    Drug screen comment 11/18/2017 (NOTE)   Final    Comment:  This test is a screen for drugs of abuse in a medical setting only  (i.e., they are unconfirmed results and as such must not be used for  non-medical purposes e.g., employment testing, legal testing). Due to its inherent nature, false positive (FP) and false negative (FN)  results may be obtained. Therefore, if necessary for medical care,  recommend confirmation of positive findings by GC/MS. The cutoff values (i.e., the level at which this screening test  becomes positive for a given drug group) are:    Amphetamine/Methamphetamine: 300 ng/mL  Barbiturates:                200 ng/mL  Benzodiazepines:             200 ng/mL  Cocaine:                     150 ng/mL  Methadone:                   300 ng/mL  Opiates:                     300 ng/mL  Phencyclidine, PCP:           25 ng/mL  Marijuana, THC:               50 ng/mL    This screening test can identify the presence of the following drugs  when above the cutoff value: See list posted on the intranet. It can  be viewed by selecting i                           n sequence the following: From the IRIS home  page, select: Shannan; Rickey Salas; Laboratory Department;  MARIAH Valverde; Laboratory Directory of Services; then List of  Detectable Drugs for Automated Urine Drug Screen. Review of Systems   Constitutional: Negative for activity change, fatigue and unexpected weight change. HENT: Negative for congestion, ear discharge, ear pain, hearing loss, rhinorrhea and sore throat. Eyes: Negative for pain, discharge and redness. Respiratory: Negative for cough, chest tightness and shortness of breath. Cardiovascular: Negative for leg swelling. Gastrointestinal: Positive for abdominal pain, constipation, diarrhea and nausea. +acid reflux  +belching   Endocrine: Negative for polyuria. Genitourinary: Negative for dysuria, flank pain and urgency. Musculoskeletal: Negative for arthralgias, back pain and myalgias. Skin: Negative for color change. Neurological: Negative for dizziness, light-headedness and headaches. Psychiatric/Behavioral: Negative for dysphoric mood and sleep disturbance. The patient is nervous/anxious. Visit Vitals  /79 (BP 1 Location: Left upper arm, BP Patient Position: Sitting)   Pulse 81   Temp 98.4 °F (36.9 °C) (Oral)   Resp 16   Ht 6' 1\" (1.854 m)   Wt 232 lb 12.8 oz (105.6 kg)   SpO2 96%   BMI 30.71 kg/m²     Physical Exam  Vitals signs and nursing note reviewed. Constitutional:       General: He is not in acute distress. Appearance: Normal appearance. He is well-developed. He is not diaphoretic. HENT:      Head: Normocephalic and atraumatic. Right Ear: External ear normal.      Left Ear: External ear normal.   Eyes:      General: No scleral icterus. Right eye: No discharge. Left eye: No discharge. Extraocular Movements: Extraocular movements intact. Conjunctiva/sclera: Conjunctivae normal.      Pupils: Pupils are equal, round, and reactive to light. Neck:      Musculoskeletal: Normal range of motion and neck supple. Cardiovascular:      Rate and Rhythm: Normal rate and regular rhythm. Heart sounds: Normal heart sounds. Pulmonary:      Effort: Pulmonary effort is normal.      Breath sounds: Normal breath sounds. No wheezing. Abdominal:      General: Bowel sounds are normal.      Palpations: Abdomen is soft. Tenderness: There is no abdominal tenderness. Musculoskeletal: Normal range of motion. Lymphadenopathy:      Cervical: No cervical adenopathy. Neurological:      Mental Status: He is alert and oriented to person, place, and time. Psychiatric:         Mood and Affect: Mood and affect normal.             ASSESSMENT/PLAN:  1. Nausea  -     REFERRAL TO GASTROENTEROLOGY  I provided  Referral with gastroenterology for further evaluation and treatment. 2. Irritable bowel syndrome with both constipation and diarrhea  -     REFERRAL TO GASTROENTEROLOGY  -     dicyclomine (BENTYL) 10 mg capsule;  Take 1 Cap by mouth three (3) times daily for 15 days. , Normal, Disp-45 Cap, R-0 sent to pharmacy. Potential side effects were discussed. I referred him to gastroenterology for further evaluation and treatment but also started him on Bentyl until he can get an appt with them. 3. Anxiety  Stable, continues with at-home counseling and is taking Cymbalta. 4. Gastroesophageal reflux disease without esophagitis  -     REFERRAL TO GASTROENTEROLOGY  I provided a referral to gastroenterology for further evaluation and treatment. He can continue on OTC omeprazole for the time being. This plan was reviewed with the patient and patient agrees. All questions were answered. This scribe documentation was reviewed by me and accurately reflects the examination and decisions made by me. An electronic signature was used to authenticate this note.   -- Cora Elizabeth

## 2021-01-29 NOTE — PROGRESS NOTES
Chief Complaint   Patient presents with    Other     nausea is spiked yesterday more than usual and it feels like a pit in the stomach and felt like felt like it was doing flips.   took pepto bismal and karopectate

## 2021-03-12 ENCOUNTER — VIRTUAL VISIT (OUTPATIENT)
Dept: PRIMARY CARE CLINIC | Age: 21
End: 2021-03-12
Payer: COMMERCIAL

## 2021-03-12 DIAGNOSIS — L01.00 IMPETIGO: Primary | ICD-10-CM

## 2021-03-12 DIAGNOSIS — R19.7 DIARRHEA, UNSPECIFIED TYPE: ICD-10-CM

## 2021-03-12 DIAGNOSIS — L29.9 PRURITUS: ICD-10-CM

## 2021-03-12 DIAGNOSIS — R58 BLOOD ON TOILET PAPER: ICD-10-CM

## 2021-03-12 PROCEDURE — 99214 OFFICE O/P EST MOD 30 MIN: CPT | Performed by: FAMILY MEDICINE

## 2021-03-12 RX ORDER — MUPIROCIN 20 MG/G
OINTMENT TOPICAL 2 TIMES DAILY
Qty: 22 G | Refills: 0 | Status: SHIPPED | OUTPATIENT
Start: 2021-03-12 | End: 2021-03-17

## 2021-03-12 NOTE — PROGRESS NOTES
Clint Bustamante  20 y.o. male  2000  [address] [facility]  [id number]   [facility]   Telemedicine Progress Note  Mackenzie Waters DO       Encounter Date and Time: 2021 at 8:46 AM    Consent: Clint Bustamante, who was seen by synchronous (real-time) audio-video technology, and/or his healthcare decision maker, is aware that this patient-initiated, Telehealth encounter on 3/12/2021 is a billable service, with coverage as determined by his insurance carrier. He is aware that he may receive a bill and has provided verbal consent to proceed: Yes. Chief Complaint   Patient presents with    Fever     History of Present Illness   Clint Bustamante is a 23 y.o. male was evaluated by synchronous (real-time) audio-video technology from home, through a secure patient portal [email or web address] me. Multiple complaints  - started getting \"itchy bumps\" in patches on L elbow, R shoulder, L leg and L foot on  that he feels he has to scratch. States he does have seasonal allergies but typically worse in the Fall. Takes Allegra PRN. Has been using an OTC anti itch cream that  in .  - has chronic GI issues, had an episode of diarrhea on Wednesday with less then quarter size amount of red blood on TP when wiping with some mild pain when wiping. Has not occurred since. Saw GI and had labs done recently that were okay. No abd pain, nausea or vomiting.  - states he took temp on Wednesday night with a digital scanning thermometer and it was 100. 3. thought it might be a false read as all other temps have been in 99's without taking anything  - works at [employer], no one else has been sick, goes through stringent health checks before each shift    Review of Systems   Review of Systems   Constitutional: Negative for chills and fever. HENT: Negative for congestion, ear pain and sore throat. Respiratory: Negative for cough and shortness of breath. Cardiovascular: Negative for chest pain. Gastrointestinal: Positive for blood in stool and diarrhea. Negative for nausea and vomiting. Vitals/Objective:     General: alert, cooperative, no distress   Mental  status: mental status: alert, oriented to person, place, and time, normal mood, behavior, speech, dress, motor activity, and thought processes   Resp: resp: normal effort and no respiratory distress   Neuro: neuro: no gross deficits   Skin: Patient has large patch that is scabbed over on L lower leg that appears to have been scratched repeatedly. There is some yellow crusting present. Also a small patch on his L ankle as well. No obvious bite marks, dry scaly patches. Due to this being a TeleHealth evaluation, many elements of the physical examination are unable to be assessed. Assessment and Plan:   Time-based coding, delete if not needed: I spent at least 15 minutes with this established patient, and >50% of the time was spent counseling and/or coordinating care regarding pruritis, dermatitis, seasonal allergies, diarrhea    1. Pruritus  Switch Allegra for Zyrtec. Can use OTC Benadryl cream. Use gentle detergents/ soaps/ lotions free of dyes and fragrances. Try not to scratch lesions. 2. Blood on toilet paper  Occurred one time, very small amount of blood on TP when wiping. Sounds like this may be 2/2 chronic diarrhea/ irritation. Could have been an anal fissure or hemorrhoid based on history? No systemic symptoms including abdominal pain, fever and has not reoccurred. Followed by GI. Recommend he follow up with GI again if these problems are continuing to persist. Closely monitor for any more episodes. If gross blood or dizziness needs to go to the ER. 3. Diarrhea, unspecified type  Chronic. Follow up with GI.     4. Impetigo  - mupirocin (BACTROBAN) 2 % ointment; Apply  to affected area two (2) times a day for 5 days. Dispense: 22 g; Refill: 0  If not improving follow up with our office.      Patient is not sure how accurate thermometer was that took his temp. All other temps have been WNL. No other URI symptoms. Continue to monitor temp closely discussed fever >100.4. Assessment/Plan: follow up if symptoms are not improving    Time spent in direct conversation with the patient to include medical condition(s) discussed, assessment and treatment plan: 17 min    We discussed the expected course, resolution and complications of the diagnosis(es) in detail. Medication risks, benefits, costs, interactions, and alternatives were discussed as indicated. I advised him to contact the office if his condition worsens, changes or fails to improve as anticipated. He expressed understanding with the diagnosis(es) and plan. Patient understands that this encounter was a temporary measure, and the importance of further follow up and examination was emphasized. Patient verbalized understanding. Patient informed to follow up: if symptoms not improving or worsening. Electronically Signed: Pan Bhatia DO    CPT Codes 32475-91673 for Established Patients may apply to this Telehealth Visit. POS code: 18. Modifier CRISTI Rhodes is a 23 y.o. male who was evaluated by an audio-video encounter for concerns as above. Patient identification was verified prior to start of the visit. A caregiver was present when appropriate. Due to this being a TeleHealth encounter (During [address] emergency), evaluation of the following organ systems was limited: Vitals/Constitutional/EENT/Resp/CV/GI//MS/Neuro/Skin/Heme-Lymph-Imm. Pursuant to the emergency declaration under the [facility] waiver authority and the [employer] Act, this Virtual Visit was conducted, with patient's (and/or legal guardian's) consent, to reduce the patient's risk of exposure to COVID-19 and provide necessary medical care.      Services were provided through a synchronous discussion virtually to substitute for in-person clinic visit. I was in the office. The patient was at home. History   Patients past medical, surgical and family histories were reviewed and updated. Past Medical History:   Diagnosis Date    ADHD     ADHD (attention deficit hyperactivity disorder)     Anxiety     Anxiety      Past Surgical History:   Procedure Laterality Date    HX OTHER SURGICAL  03/01/2018    cauterization of nose      No family history on file.   Social History     Socioeconomic History    Marital status: SINGLE     Spouse name: Not on file    Number of children: Not on file    Years of education: Not on file    Highest education level: Not on file   Occupational History    Not on file   Social Needs    Financial resource strain: Not on file    Food insecurity     Worry: Not on file     Inability: Not on file    Transportation needs     Medical: Not on file     Non-medical: Not on file   Tobacco Use    Smoking status: Never Smoker    Smokeless tobacco: Never Used   Substance and Sexual Activity    Alcohol use: No     Frequency: Never    Drug use: No    Sexual activity: Not Currently     Birth control/protection: None   Lifestyle    Physical activity     Days per week: Not on file     Minutes per session: Not on file    Stress: Not on file   Relationships    Social connections     Talks on phone: Not on file     Gets together: Not on file     Attends Pentecostalism service: Not on file     Active member of club or organization: Not on file     Attends meetings of clubs or organizations: Not on file     Relationship status: Not on file    Intimate partner violence     Fear of current or ex partner: Not on file     Emotionally abused: Not on file     Physically abused: Not on file     Forced sexual activity: Not on file   Other Topics Concern    Not on file   Social History Narrative    Not on file     Patient Active Problem List   Diagnosis Code    Attention deficit hyperactivity disorder (ADHD), combined type F90.2          Current Medications/Allergies   Medications and Allergies reviewed:    Current Outpatient Medications   Medication Sig Dispense Refill    mupirocin (BACTROBAN) 2 % ointment Apply  to affected area two (2) times a day for 5 days. 22 g 0    sodium chloride-sod bicarb-hyaluronate sodium-aloe 0.9 % nasal gel Apply  to affected area two (2) times a day. 30 g 0    DULoxetine (CYMBALTA) 20 mg capsule       methylphenidate ER 54 mg 24 hr tab Take one tablet by mouth daily 30 Tab 0    fexofenadine-pseudoephedrine (ALLEGRA-D 12 HOUR)  mg Tb12 Take 1 Tab by mouth every twelve (12) hours.        No Known Allergies

## 2021-05-12 ENCOUNTER — OFFICE VISIT (OUTPATIENT)
Dept: PRIMARY CARE CLINIC | Age: 21
End: 2021-05-12
Payer: COMMERCIAL

## 2021-05-12 VITALS
HEIGHT: 73 IN | OXYGEN SATURATION: 95 % | BODY MASS INDEX: 31.51 KG/M2 | WEIGHT: 237.8 LBS | DIASTOLIC BLOOD PRESSURE: 75 MMHG | HEART RATE: 67 BPM | TEMPERATURE: 97.8 F | SYSTOLIC BLOOD PRESSURE: 103 MMHG | RESPIRATION RATE: 18 BRPM

## 2021-05-12 DIAGNOSIS — F32.A ANXIETY AND DEPRESSION: ICD-10-CM

## 2021-05-12 DIAGNOSIS — R10.13 DYSPEPSIA: Primary | ICD-10-CM

## 2021-05-12 DIAGNOSIS — R40.0 DAYTIME SLEEPINESS: ICD-10-CM

## 2021-05-12 DIAGNOSIS — E66.9 OBESITY (BMI 30.0-34.9): ICD-10-CM

## 2021-05-12 DIAGNOSIS — R53.83 OTHER FATIGUE: ICD-10-CM

## 2021-05-12 DIAGNOSIS — R11.0 NAUSEA: ICD-10-CM

## 2021-05-12 DIAGNOSIS — F41.9 ANXIETY AND DEPRESSION: ICD-10-CM

## 2021-05-12 PROCEDURE — 99214 OFFICE O/P EST MOD 30 MIN: CPT | Performed by: INTERNAL MEDICINE

## 2021-05-12 RX ORDER — ARIPIPRAZOLE 2 MG/1
15 TABLET ORAL EVERY EVENING
COMMUNITY
Start: 2021-03-26

## 2021-05-12 RX ORDER — DULOXETIN HYDROCHLORIDE 60 MG/1
150 CAPSULE, DELAYED RELEASE ORAL DAILY
COMMUNITY
Start: 2021-03-26

## 2021-05-12 RX ORDER — BUSPIRONE HYDROCHLORIDE 10 MG/1
10 TABLET ORAL 3 TIMES DAILY
COMMUNITY
Start: 2021-04-05

## 2021-05-12 RX ORDER — ONDANSETRON 8 MG/1
8 TABLET, ORALLY DISINTEGRATING ORAL
Qty: 10 TAB | Refills: 0 | Status: SHIPPED | OUTPATIENT
Start: 2021-05-12 | End: 2021-06-01

## 2021-05-12 RX ORDER — OMEPRAZOLE 40 MG/1
40 CAPSULE, DELAYED RELEASE ORAL DAILY
Qty: 30 CAP | Refills: 0 | Status: SHIPPED | OUTPATIENT
Start: 2021-05-12 | End: 2021-06-01

## 2021-05-12 NOTE — PROGRESS NOTES
Chief Complaint   Patient presents with    GI Problem     excess burping, nasusea    Referral Request     sleep study

## 2021-05-12 NOTE — PROGRESS NOTES
Trish Mejía (: 2000) is a 23 y.o. male, established patient, here for evaluation of the following chief complaint(s):  GI Problem (excess burping, nasusea) and Referral Request (sleep study)     Written by Tara Yu, as dictated by Dr. Luzmaria Leonardo MD.      ASSESSMENT/PLAN:  Below is the assessment and plan developed based on review of pertinent history, physical exam, labs, studies, and medications. 1. Dyspepsia  Prescribed Prilosec 40mg daily for dyspepsia. Instructed him to take this in the morning. Potential side effects were discussed. He has an appointment with gastroenterology, Dr. Yina Pope, on 21.  -     omeprazole (PRILOSEC) 40 mg capsule; Take 1 Cap by mouth daily for 30 days. , Normal, Disp-30 Cap, R-0 sent to pharmacy. 2. Nausea  Prescribed Zofram 8mg to take prn for nausea. Potential side effects were discussed. He has an appointment with gastroenterology, Dr. Yina Pope, on 21.  -     ondansetron (ZOFRAN ODT) 8 mg disintegrating tablet; Take 1 Tab by mouth every eight (8) hours as needed for Nausea or Vomiting for up to 10 doses. , Normal, Disp-10 Tab, R-0 sent to pharmacy. 3. Daytime sleepiness  Provided the patient with a written referral to sleep medicine.   -     SLEEP MEDICINE REFERRAL    4. Other fatigue  Provided the patient with a written referral to sleep medicine.   -     SLEEP MEDICINE REFERRAL    5. Anxiety and depression  Stable at this time. Continue with at home counseling, Cymbalta 60mg daily, and Buspar 10mg daily. 6. Obesity (BMI 30.0-34. 9)  Explained that they should be walking 5,000 steps daily to maintain conditioning and weight and increase to at least 10,000 steps to work on losing weight.   -     SLEEP MEDICINE REFERRAL      SUBJECTIVE/OBJECTIVE:  HPI  The patient presents today c/o excessive burping, nausea, and occasional vomiting. He has an appointment with gastroenterology, Dr. Yina Pope, on 21. He is taking omeprazole in the evening. He is also requesting a referral to sleep study. He states that his therapist, Fareed Pak, suggested he get a sleep study. He feels very fatigued in the morning and throughout the day. He is doing intensive in home therapy for his anxiety and continues on Cymbalta 60mg daily. He was recently started on Buspar 10mg a month ago. Patient Active Problem List   Diagnosis Code    Attention deficit hyperactivity disorder (ADHD), combined type F90.2        Current Outpatient Medications on File Prior to Visit   Medication Sig Dispense Refill    ARIPiprazole (ABILIFY) 2 mg tablet       busPIRone (BUSPAR) 10 mg tablet       DULoxetine (CYMBALTA) 60 mg capsule Take 2 Caps by mouth daily.  methylphenidate ER 54 mg 24 hr tab Take one tablet by mouth daily 30 Tab 0    fexofenadine-pseudoephedrine (ALLEGRA-D 12 HOUR)  mg Tb12 Take 1 Tab by mouth every twelve (12) hours.  sodium chloride-sod bicarb-hyaluronate sodium-aloe 0.9 % nasal gel Apply  to affected area two (2) times a day. 30 g 0    [DISCONTINUED] DULoxetine (CYMBALTA) 20 mg capsule        No current facility-administered medications on file prior to visit. No Known Allergies    Past Medical History:   Diagnosis Date    ADHD     ADHD (attention deficit hyperactivity disorder)     Anxiety     Anxiety        Past Surgical History:   Procedure Laterality Date    HX OTHER SURGICAL  03/01/2018    cauterization of nose        No family history on file.     Social History     Socioeconomic History    Marital status: SINGLE     Spouse name: Not on file    Number of children: Not on file    Years of education: Not on file    Highest education level: Not on file   Occupational History    Not on file   Social Needs    Financial resource strain: Not on file    Food insecurity     Worry: Not on file     Inability: Not on file    Transportation needs     Medical: Not on file     Non-medical: Not on file   Tobacco Use    Smoking status: Never Smoker    Smokeless tobacco: Never Used   Substance and Sexual Activity    Alcohol use: No     Frequency: Never    Drug use: No    Sexual activity: Not Currently     Birth control/protection: None   Lifestyle    Physical activity     Days per week: Not on file     Minutes per session: Not on file    Stress: Not on file   Relationships    Social connections     Talks on phone: Not on file     Gets together: Not on file     Attends Yazdanism service: Not on file     Active member of club or organization: Not on file     Attends meetings of clubs or organizations: Not on file     Relationship status: Not on file    Intimate partner violence     Fear of current or ex partner: Not on file     Emotionally abused: Not on file     Physically abused: Not on file     Forced sexual activity: Not on file   Other Topics Concern    Not on file   Social History Narrative    Not on file       No visits with results within 3 Month(s) from this visit.    Latest known visit with results is:   Admission on 11/18/2017, Discharged on 11/18/2017   Component Date Value Ref Range Status    Ventricular Rate 11/18/2017 102  BPM Final    Atrial Rate 11/18/2017 102  BPM Final    P-R Interval 11/18/2017 152  ms Final    QRS Duration 11/18/2017 90  ms Final    Q-T Interval 11/18/2017 322  ms Final    QTC Calculation (Bezet) 11/18/2017 419  ms Final    Calculated P Axis 11/18/2017 55  degrees Final    Calculated R Axis 11/18/2017 79  degrees Final    Calculated T Axis 11/18/2017 37  degrees Final    Diagnosis 11/18/2017    Final                    Value:Sinus tachycardia  When compared with ECG of 12-JUL-2010 09:52,  Rate is increased, otherwise no significant change  Confirmed by Carito Garsia M.D., [place] ([id number]) on 11/19/2017 3:06:36 PM      Sodium 11/18/2017 141  136 - 145 mmol/L Final    Potassium 11/18/2017 3.6  3.5 - 5.1 mmol/L Final    Chloride 11/18/2017 107  97 - 108 mmol/L Final    CO2 11/18/2017 29  21 - 32 mmol/L Final    Anion gap 11/18/2017 5  5 - 15 mmol/L Final    Glucose 11/18/2017 75  65 - 100 mg/dL Final    BUN 11/18/2017 16  6 - 20 MG/DL Final    Creatinine 11/18/2017 0.90  0.70 - 1.30 MG/DL Final    BUN/Creatinine ratio 11/18/2017 18  12 - 20   Final    GFR est AA 11/18/2017 >60  >60 ml/min/1.73m2 Final    GFR est non-AA 11/18/2017 >60  >60 ml/min/1.73m2 Final    Comment: Estimated GFR is calculated using the IDMS-traceable Modification of Diet in Renal Disease (MDRD) Study equation, reported for both  Americans (GFRAA) and non- Americans (GFRNA), and normalized to 1.73m2 body surface area. The physician must decide which value applies to the patient. The MDRD study equation should only be used in individuals age 25 or older. It has not been validated for the following: pregnant women, patients with serious comorbid conditions, or on certain medications, or persons with extremes of body size, muscle mass, or nutritional status.  Calcium 11/18/2017 9.4  8.5 - 10.1 MG/DL Final    Bilirubin, total 11/18/2017 0.3  0.2 - 1.0 MG/DL Final    ALT (SGPT) 11/18/2017 23  12 - 78 U/L Final    AST (SGOT) 11/18/2017 10* 15 - 37 U/L Final    Alk. phosphatase 11/18/2017 101  60 - 330 U/L Final    Protein, total 11/18/2017 7.4  6.4 - 8.2 g/dL Final    Albumin 11/18/2017 4.1  3.5 - 5.0 g/dL Final    Globulin 11/18/2017 3.3  2.0 - 4.0 g/dL Final    A-G Ratio 11/18/2017 1.2  1.1 - 2.2   Final    CK 11/18/2017 127  39 - 308 U/L Final    Acetaminophen level 11/18/2017 <2* 10 - 30 ug/mL Final    Acetaminophen greater than 150 µg/mL at 4 hours after ingestion and 50 µg/mL at 12 hours after ingestion is often associated with toxic reactions.     Salicylate level 25/59/8467 <1.7* 2.8 - 20.0 MG/DL Final    Comment: Analgesic: 2-10 mg/dL  Anti-inflammatory: 10-30 mg/dl  Toxic: >30 mg/dl      ALCOHOL(ETHYL),SERUM 11/18/2017 <10  <10 MG/DL Final    Color 11/18/2017 YELLOW/STRAW    Final    Color Reference Range: Straw, Yellow or Dark Yellow    Appearance 11/18/2017 CLOUDY* CLEAR   Final    Specific gravity 11/18/2017 1.019  1.003 - 1.030   Final    pH (UA) 11/18/2017 7.5  5.0 - 8.0   Final    Protein 11/18/2017 NEGATIVE   NEG mg/dL Final    Glucose 11/18/2017 NEGATIVE   NEG mg/dL Final    Ketone 11/18/2017 NEGATIVE   NEG mg/dL Final    Bilirubin 11/18/2017 NEGATIVE   NEG   Final    Blood 11/18/2017 NEGATIVE   NEG   Final    Urobilinogen 11/18/2017 0.2  0.2 - 1.0 EU/dL Final    Nitrites 11/18/2017 NEGATIVE   NEG   Final    Leukocyte Esterase 11/18/2017 NEGATIVE   NEG   Final    AMPHETAMINES 11/18/2017 NEGATIVE   NEG   Final    BARBITURATES 11/18/2017 NEGATIVE   NEG   Final    BENZODIAZEPINES 11/18/2017 NEGATIVE   NEG   Final    COCAINE 11/18/2017 NEGATIVE   NEG   Final    METHADONE 11/18/2017 NEGATIVE   NEG   Final    OPIATES 11/18/2017 NEGATIVE   NEG   Final    PCP(PHENCYCLIDINE) 11/18/2017 NEGATIVE   NEG   Final    THC (TH-CANNABINOL) 11/18/2017 NEGATIVE   NEG   Final    Drug screen comment 11/18/2017 (NOTE)   Final    Comment: This test is a screen for drugs of abuse in a medical setting only  (i.e., they are unconfirmed results and as such must not be used for  non-medical purposes e.g., employment testing, legal testing). Due to its inherent nature, false positive (FP) and false negative (FN)  results may be obtained. Therefore, if necessary for medical care,  recommend confirmation of positive findings by GC/MS.     The cutoff values (i.e., the level at which this screening test  becomes positive for a given drug group) are:    Amphetamine/Methamphetamine: 300 ng/mL  Barbiturates:                200 ng/mL  Benzodiazepines:             200 ng/mL  Cocaine:                     150 ng/mL  Methadone:                   300 ng/mL  Opiates:                     300 ng/mL  Phencyclidine, PCP:           25 ng/mL  Marijuana, THC:               50 ng/mL    This screening test can identify the presence of the following drugs  when above the cutoff value: See list posted on the intranet. It can  be viewed by selecting i                           n sequence the following: From the IRIS home  page, select: Shannan; Jb Bur; Laboratory Department;  MARIAH Valverde; Laboratory Directory of Services; then List of  Detectable Drugs for Automated Urine Drug Screen. Review of Systems   Constitutional: Positive for fatigue. Negative for activity change and unexpected weight change. HENT: Negative for congestion, ear discharge, ear pain, hearing loss, rhinorrhea and sore throat. Eyes: Negative for pain, discharge and redness. Respiratory: Negative for cough, chest tightness and shortness of breath. Cardiovascular: Negative for leg swelling. Gastrointestinal: Positive for nausea and vomiting (occasional). Negative for abdominal pain, constipation and diarrhea. Endocrine: Negative for polyuria. Genitourinary: Negative for dysuria, flank pain and urgency. Musculoskeletal: Negative for arthralgias, back pain and myalgias. Skin: Negative for color change. Neurological: Negative for dizziness, light-headedness and headaches. Psychiatric/Behavioral: Negative for dysphoric mood and sleep disturbance. The patient is nervous/anxious. Visit Vitals  /75 (BP 1 Location: Left upper arm, BP Patient Position: Sitting)   Pulse 67   Temp 97.8 °F (36.6 °C) (Temporal)   Resp 18   Ht 6' 1\" (1.854 m)   Wt 237 lb 12.8 oz (107.9 kg)   SpO2 95%   BMI 31.37 kg/m²      Physical Exam  Vitals signs and nursing note reviewed. Constitutional:       General: He is not in acute distress. Appearance: Normal appearance. He is well-developed. He is not diaphoretic. HENT:      Head: Normocephalic and atraumatic. Right Ear: External ear normal.      Left Ear: External ear normal.      Mouth/Throat:      Mouth: Mucous membranes are moist.      Pharynx: Oropharynx is clear. Eyes:      General: No scleral icterus. Right eye: No discharge. Left eye: No discharge. Extraocular Movements: Extraocular movements intact. Conjunctiva/sclera: Conjunctivae normal.      Pupils: Pupils are equal, round, and reactive to light. Cardiovascular:      Rate and Rhythm: Normal rate and regular rhythm. Pulses: Normal pulses. Heart sounds: Normal heart sounds. Pulmonary:      Effort: Pulmonary effort is normal.      Breath sounds: Normal breath sounds. No wheezing. Musculoskeletal:      Right lower leg: No edema. Left lower leg: No edema. Neurological:      Mental Status: He is alert and oriented to person, place, and time. Psychiatric:         Mood and Affect: Mood and affect normal.         An electronic signature was used to authenticate this note.   -- Talib Angela

## 2021-05-17 ENCOUNTER — TELEPHONE (OUTPATIENT)
Dept: PRIMARY CARE CLINIC | Age: 21
End: 2021-05-17

## 2021-05-17 NOTE — TELEPHONE ENCOUNTER
Spoke with patient, explained that a letter can be given that he was seen in office on the day of his appointment however it will not say that he is neg for covid because we do not test for that here.

## 2021-05-17 NOTE — TELEPHONE ENCOUNTER
Patient stated he needs a note for work stating that he was seen for GI issues not covid. Patient request a phone call when the note for work is available to be picked up.  [phone]

## 2021-06-01 ENCOUNTER — APPOINTMENT (OUTPATIENT)
Dept: CT IMAGING | Age: 21
End: 2021-06-01
Attending: PHYSICIAN ASSISTANT
Payer: COMMERCIAL

## 2021-06-01 ENCOUNTER — HOSPITAL ENCOUNTER (EMERGENCY)
Age: 21
Discharge: HOME | End: 2021-06-01
Attending: EMERGENCY MEDICINE
Payer: COMMERCIAL

## 2021-06-01 VITALS
HEART RATE: 92 BPM | DIASTOLIC BLOOD PRESSURE: 81 MMHG | RESPIRATION RATE: 18 BRPM | TEMPERATURE: 98.8 F | WEIGHT: 235.67 LBS | BODY MASS INDEX: 31.09 KG/M2 | OXYGEN SATURATION: 100 % | SYSTOLIC BLOOD PRESSURE: 119 MMHG

## 2021-06-01 DIAGNOSIS — R10.31 ABDOMINAL PAIN, RLQ: Primary | ICD-10-CM

## 2021-06-01 LAB
ALBUMIN SERPL-MCNC: 4.1 G/DL (ref 3.5–5)
ALBUMIN/GLOB SERPL: 1.2 {RATIO} (ref 1.1–2.2)
ALP SERPL-CCNC: 73 U/L (ref 45–117)
ALT SERPL-CCNC: 59 U/L (ref 12–78)
ANION GAP SERPL CALC-SCNC: 10 MMOL/L (ref 5–15)
APPEARANCE UR: CLEAR
AST SERPL-CCNC: 29 U/L (ref 15–37)
BACTERIA URNS QL MICRO: NEGATIVE /HPF
BASOPHILS # BLD: 0 K/UL (ref 0–0.1)
BASOPHILS NFR BLD: 1 % (ref 0–1)
BILIRUB SERPL-MCNC: 0.4 MG/DL (ref 0.2–1)
BILIRUB UR QL: NEGATIVE
BUN SERPL-MCNC: 14 MG/DL (ref 6–20)
BUN/CREAT SERPL: 13 (ref 12–20)
CALCIUM SERPL-MCNC: 9.2 MG/DL (ref 8.5–10.1)
CHLORIDE SERPL-SCNC: 105 MMOL/L (ref 97–108)
CO2 SERPL-SCNC: 27 MMOL/L (ref 21–32)
COLOR UR: NORMAL
CREAT SERPL-MCNC: 1.05 MG/DL (ref 0.7–1.3)
DIFFERENTIAL METHOD BLD: NORMAL
EOSINOPHIL # BLD: 0.2 K/UL (ref 0–0.4)
EOSINOPHIL NFR BLD: 3 % (ref 0–7)
EPITH CASTS URNS QL MICRO: NORMAL /LPF
ERYTHROCYTE [DISTWIDTH] IN BLOOD BY AUTOMATED COUNT: 11.9 % (ref 11.5–14.5)
GLOBULIN SER CALC-MCNC: 3.4 G/DL (ref 2–4)
GLUCOSE SERPL-MCNC: 85 MG/DL (ref 65–100)
GLUCOSE UR STRIP.AUTO-MCNC: NEGATIVE MG/DL
HCT VFR BLD AUTO: 47 % (ref 36.6–50.3)
HGB BLD-MCNC: 16 G/DL (ref 12.1–17)
HGB UR QL STRIP: NEGATIVE
IMM GRANULOCYTES # BLD AUTO: 0 K/UL (ref 0–0.04)
IMM GRANULOCYTES NFR BLD AUTO: 0 % (ref 0–0.5)
KETONES UR QL STRIP.AUTO: NEGATIVE MG/DL
LEUKOCYTE ESTERASE UR QL STRIP.AUTO: NEGATIVE
LIPASE SERPL-CCNC: 127 U/L (ref 73–393)
LYMPHOCYTES # BLD: 2.3 K/UL (ref 0.8–3.5)
LYMPHOCYTES NFR BLD: 34 % (ref 12–49)
MCH RBC QN AUTO: 30 PG (ref 26–34)
MCHC RBC AUTO-ENTMCNC: 34 G/DL (ref 30–36.5)
MCV RBC AUTO: 88.2 FL (ref 80–99)
MONOCYTES # BLD: 0.5 K/UL (ref 0–1)
MONOCYTES NFR BLD: 8 % (ref 5–13)
NEUTS SEG # BLD: 3.6 K/UL (ref 1.8–8)
NEUTS SEG NFR BLD: 54 % (ref 32–75)
NITRITE UR QL STRIP.AUTO: NEGATIVE
NRBC # BLD: 0 K/UL (ref 0–0.01)
NRBC BLD-RTO: 0 PER 100 WBC
PH UR STRIP: 5.5 [PH] (ref 5–8)
PLATELET # BLD AUTO: 301 K/UL (ref 150–400)
PMV BLD AUTO: 9.1 FL (ref 8.9–12.9)
POTASSIUM SERPL-SCNC: 4.1 MMOL/L (ref 3.5–5.1)
PROT SERPL-MCNC: 7.5 G/DL (ref 6.4–8.2)
PROT UR STRIP-MCNC: NEGATIVE MG/DL
RBC # BLD AUTO: 5.33 M/UL (ref 4.1–5.7)
RBC #/AREA URNS HPF: NORMAL /HPF (ref 0–5)
SODIUM SERPL-SCNC: 142 MMOL/L (ref 136–145)
SP GR UR REFRACTOMETRY: 1.02 (ref 1–1.03)
UR CULT HOLD, URHOLD: NORMAL
UROBILINOGEN UR QL STRIP.AUTO: 0.2 EU/DL (ref 0.2–1)
WBC # BLD AUTO: 6.7 K/UL (ref 4.1–11.1)
WBC URNS QL MICRO: NORMAL /HPF (ref 0–4)

## 2021-06-01 PROCEDURE — 96375 TX/PRO/DX INJ NEW DRUG ADDON: CPT

## 2021-06-01 PROCEDURE — 99284 EMERGENCY DEPT VISIT MOD MDM: CPT

## 2021-06-01 PROCEDURE — 74177 CT ABD & PELVIS W/CONTRAST: CPT

## 2021-06-01 PROCEDURE — 74011250636 HC RX REV CODE- 250/636: Performed by: PHYSICIAN ASSISTANT

## 2021-06-01 PROCEDURE — 36415 COLL VENOUS BLD VENIPUNCTURE: CPT

## 2021-06-01 PROCEDURE — 80053 COMPREHEN METABOLIC PANEL: CPT

## 2021-06-01 PROCEDURE — 83690 ASSAY OF LIPASE: CPT

## 2021-06-01 PROCEDURE — 81001 URINALYSIS AUTO W/SCOPE: CPT

## 2021-06-01 PROCEDURE — 85025 COMPLETE CBC W/AUTO DIFF WBC: CPT

## 2021-06-01 PROCEDURE — 96374 THER/PROPH/DIAG INJ IV PUSH: CPT

## 2021-06-01 PROCEDURE — 74011000636 HC RX REV CODE- 636: Performed by: EMERGENCY MEDICINE

## 2021-06-01 RX ORDER — KETOROLAC TROMETHAMINE 30 MG/ML
15 INJECTION, SOLUTION INTRAMUSCULAR; INTRAVENOUS
Status: COMPLETED | OUTPATIENT
Start: 2021-06-01 | End: 2021-06-01

## 2021-06-01 RX ORDER — ONDANSETRON 2 MG/ML
4 INJECTION INTRAMUSCULAR; INTRAVENOUS
Status: COMPLETED | OUTPATIENT
Start: 2021-06-01 | End: 2021-06-01

## 2021-06-01 RX ORDER — FLUTICASONE PROPIONATE 50 MCG
2 SPRAY, SUSPENSION (ML) NASAL DAILY
COMMUNITY

## 2021-06-01 RX ADMIN — IOPAMIDOL 100 ML: 755 INJECTION, SOLUTION INTRAVENOUS at 16:42

## 2021-06-01 RX ADMIN — ONDANSETRON 4 MG: 2 INJECTION INTRAMUSCULAR; INTRAVENOUS at 15:57

## 2021-06-01 RX ADMIN — SODIUM CHLORIDE 1000 ML: 9 INJECTION, SOLUTION INTRAVENOUS at 15:56

## 2021-06-01 RX ADMIN — KETOROLAC TROMETHAMINE 15 MG: 30 INJECTION, SOLUTION INTRAMUSCULAR at 15:56

## 2021-06-01 NOTE — ED PROVIDER NOTES
29-year-old male with past medical history significant for autism spectrum disorder, ADHD, anxiety, depression, gastritis on Prilosec, currently being worked up by PCP for possible IBS, presents ambulatory with mother for evaluation of acute onset right lower quadrant pain that began last night around 7 PM.  He states the pain is sharp and stabbing in nature, nonradiating and is located in the right lower quadrant. He specifically denies fever, chills, testicle pain, testicle swelling, flank pain. He notes a history of chronic intermittent vomiting and chronic intermittent diarrhea and attributes this to his possible IBS and states this is unchanged since the right lower quadrant pain began. He states he did vomit once this morning but states \"I vomit every day. \"  He denies black or bloody stool, anorexia. He denies history of similar symptoms, no personal or family history of kidney stones and he states having similar pain one time in the past 2 years ago and was not evaluated for this. He had the pain a few times last night and then once approximately 1 hour prior to arrival while at work after voiding. Chart review he has scheduled to see Dr. Dariana Arrington GI in 2 days on 6/3/2021. Surgical historyno abdominal surgeries  Social historydenies EtOH use, denies tobacco use           Past Medical History:   Diagnosis Date    ADHD (attention deficit hyperactivity disorder)     Anxiety     Autism spectrum disorder     Depression        Past Surgical History:   Procedure Laterality Date    HX HEENT      wisdom teeth    HX OTHER SURGICAL  03/01/2018    cauterization of nose          History reviewed. No pertinent family history.     Social History     Socioeconomic History    Marital status: SINGLE     Spouse name: Not on file    Number of children: Not on file    Years of education: Not on file    Highest education level: Not on file   Occupational History    Not on file   Tobacco Use    Smoking status: Never Smoker    Smokeless tobacco: Never Used   Substance and Sexual Activity    Alcohol use: No    Drug use: No    Sexual activity: Not Currently     Birth control/protection: None   Other Topics Concern    Not on file   Social History Narrative    Not on file     Social Determinants of Health     Financial Resource Strain:     Difficulty of Paying Living Expenses:    Food Insecurity:     Worried About Running Out of Food in the Last Year:     [address] in the Last Year:    Transportation Needs:     Lack of Transportation (Medical):  Lack of Transportation (Non-Medical):    Physical Activity:     Days of Exercise per Week:     Minutes of Exercise per Session:    Stress:     Feeling of Stress :    Social Connections:     Frequency of Communication with Friends and Family:     Frequency of Social Gatherings with Friends and Family:     Attends [employer] Services:     Active Member of Clubs or Organizations:     Attends Club or Organization Meetings:     Marital Status:    Intimate Partner Violence:     Fear of Current or Ex-Partner:     Emotionally Abused:     Physically Abused:     Sexually Abused: ALLERGIES: Patient has no known allergies. Review of Systems   Constitutional: Negative. Negative for activity change, chills, fatigue and unexpected weight change. HENT: Negative for trouble swallowing. Respiratory: Negative for cough, chest tightness, shortness of breath and wheezing. Cardiovascular: Negative. Negative for chest pain and palpitations. Gastrointestinal: Positive for abdominal pain (RLQ) and nausea. Negative for diarrhea and vomiting. Genitourinary: Negative. Negative for dysuria, flank pain, frequency and hematuria. Musculoskeletal: Negative. Negative for arthralgias, back pain, neck pain and neck stiffness. Skin: Negative. Negative for color change and rash. Neurological: Negative.   Negative for dizziness, numbness and headaches. All other systems reviewed and are negative. Vitals:    06/01/21 1535 06/01/21 1540   BP:  118/78   Pulse:  96   Resp:  18   Temp:  98.8 °F (37.1 °C)   SpO2:  97%   Weight: 106.9 kg (235 lb 10.8 oz)             Physical Exam  Vitals and nursing note reviewed. Constitutional:       General: He is not in acute distress. Appearance: He is well-developed. He is not toxic-appearing or diaphoretic. Comments: WM, elevated BMI   HENT:      Head: Normocephalic and atraumatic. Eyes:      General:         Right eye: No discharge. Left eye: No discharge. Conjunctiva/sclera: Conjunctivae normal.      Pupils: Pupils are equal, round, and reactive to light. Neck:      Trachea: No tracheal tenderness. Cardiovascular:      Rate and Rhythm: Normal rate and regular rhythm. Pulses: Normal pulses. Heart sounds: Normal heart sounds. No murmur heard. No friction rub. No gallop. Pulmonary:      Effort: Pulmonary effort is normal. No respiratory distress. Breath sounds: Normal breath sounds. No wheezing or rales. Chest:      Chest wall: No tenderness. Abdominal:      General: Bowel sounds are normal. There is no distension. Palpations: Abdomen is soft. Tenderness: There is no abdominal tenderness. There is no guarding or rebound. Musculoskeletal:         General: No tenderness. Normal range of motion. Cervical back: Full passive range of motion without pain and normal range of motion. Skin:     General: Skin is warm and dry. Capillary Refill: Capillary refill takes less than 2 seconds. Findings: No abrasion, erythema or rash. Neurological:      Mental Status: He is alert and oriented to person, place, and time. Cranial Nerves: No cranial nerve deficit. Sensory: No sensory deficit.       Coordination: Coordination normal.   Psychiatric:         Speech: Speech normal.          MDM  Number of Diagnoses or Management Options  Diagnosis management comments:   Ddx: appendicitis, kidney stone, UTI       Amount and/or Complexity of Data Reviewed  Clinical lab tests: ordered and reviewed  Tests in the radiology section of CPT®: reviewed and ordered  Review and summarize past medical records: yes  Discuss the patient with other providers: yes    Patient Progress  Patient progress: stable         Procedures    I discussed patient's PMH, exam findings as well as careplan with the ER attending who agrees with care plan. Chandrika Hernandez PA-C    Patient has been reassessed and pain is 0/10 after Toradol. Discussed CT findings which are unremarkable. Encouraged warm compresses, avoid heavy lifting, keep GI appointment in 2 days as scheduled, consider light diet until GI appointment. Avoid consistent use of NSAIDs due to gastritis history discussed. Return precautions discussed. Chandrika Hernandez PA-C      DISCHARGE NOTE:  5:24 PM  The patient has been re-evaluated and feeling much better and are stable for discharge. All available radiology and laboratory results have been reviewed with patient and/or available family. Patient and/or family verbally conveyed their understanding and agreement of the patient's signs, symptoms, diagnosis, treatment and prognosis and additionally agree to follow-up as recommended in the discharge instructions or to return to the Emergency Department should their condition change or worsen prior to their follow-up appointment. All questions have been answered and patient and/or available family express understanding.       LABORATORY RESULTS:  Recent Results (from the past 12 hour(s))   CBC WITH AUTOMATED DIFF    Collection Time: 06/01/21  3:48 PM   Result Value Ref Range    WBC 6.7 4.1 - 11.1 K/uL    RBC 5.33 4.10 - 5.70 M/uL    HGB 16.0 12.1 - 17.0 g/dL    HCT 47.0 36.6 - 50.3 %    MCV 88.2 80.0 - 99.0 FL    MCH 30.0 26.0 - 34.0 PG    MCHC 34.0 30.0 - 36.5 g/dL    RDW 11.9 11.5 - 14.5 %    PLATELET 301 150 - 400 K/uL    MPV 9.1 8.9 - 12.9 FL    NRBC 0.0 0  WBC    ABSOLUTE NRBC 0.00 0.00 - 0.01 K/uL    NEUTROPHILS 54 32 - 75 %    LYMPHOCYTES 34 12 - 49 %    MONOCYTES 8 5 - 13 %    EOSINOPHILS 3 0 - 7 %    BASOPHILS 1 0 - 1 %    IMMATURE GRANULOCYTES 0 0.0 - 0.5 %    ABS. NEUTROPHILS 3.6 1.8 - 8.0 K/UL    ABS. LYMPHOCYTES 2.3 0.8 - 3.5 K/UL    ABS. MONOCYTES 0.5 0.0 - 1.0 K/UL    ABS. EOSINOPHILS 0.2 0.0 - 0.4 K/UL    ABS. BASOPHILS 0.0 0.0 - 0.1 K/UL    ABS. IMM. GRANS. 0.0 0.00 - 0.04 K/UL    DF AUTOMATED     METABOLIC PANEL, COMPREHENSIVE    Collection Time: 06/01/21  3:48 PM   Result Value Ref Range    Sodium 142 136 - 145 mmol/L    Potassium 4.1 3.5 - 5.1 mmol/L    Chloride 105 97 - 108 mmol/L    CO2 27 21 - 32 mmol/L    Anion gap 10 5 - 15 mmol/L    Glucose 85 65 - 100 mg/dL    BUN 14 6 - 20 MG/DL    Creatinine 1.05 0.70 - 1.30 MG/DL    BUN/Creatinine ratio 13 12 - 20      GFR est AA >60 >60 ml/min/1.73m2    GFR est non-AA >60 >60 ml/min/1.73m2    Calcium 9.2 8.5 - 10.1 MG/DL    Bilirubin, total 0.4 0.2 - 1.0 MG/DL    ALT (SGPT) 59 12 - 78 U/L    AST (SGOT) 29 15 - 37 U/L    Alk.  phosphatase 73 45 - 117 U/L    Protein, total 7.5 6.4 - 8.2 g/dL    Albumin 4.1 3.5 - 5.0 g/dL    Globulin 3.4 2.0 - 4.0 g/dL    A-G Ratio 1.2 1.1 - 2.2     LIPASE    Collection Time: 06/01/21  3:48 PM   Result Value Ref Range    Lipase 127 73 - 393 U/L   URINALYSIS W/MICROSCOPIC    Collection Time: 06/01/21  4:47 PM   Result Value Ref Range    Color YELLOW/STRAW      Appearance CLEAR CLEAR      Specific gravity 1.020 1.003 - 1.030      pH (UA) 5.5 5.0 - 8.0      Protein Negative NEG mg/dL    Glucose Negative NEG mg/dL    Ketone Negative NEG mg/dL    Bilirubin Negative NEG      Blood Negative NEG      Urobilinogen 0.2 0.2 - 1.0 EU/dL    Nitrites Negative NEG      Leukocyte Esterase Negative NEG      WBC 0-4 0 - 4 /hpf    RBC 0-5 0 - 5 /hpf    Epithelial cells FEW FEW /lpf    Bacteria Negative NEG /hpf   URINE CULTURE HOLD SAMPLE    Collection Time: 06/01/21  4:47 PM    Specimen: Serum; Urine   Result Value Ref Range    Urine culture hold        Urine on hold in Microbiology dept for 2 days. If unpreserved urine is submitted, it cannot be used for addtional testing after 24 hours, recollection will be required. IMAGING RESULTS:  CT ABD PELV W CONT    Result Date: 6/1/2021  No evidence of an acute intra-abdominal process, including appendicitis or urinary tract obstruction. MEDICATIONS GIVEN:  Medications   ketorolac (TORADOL) injection 15 mg (15 mg IntraVENous Given 6/1/21 1556)   ondansetron (ZOFRAN) injection 4 mg (4 mg IntraVENous Given 6/1/21 1557)   sodium chloride 0.9 % bolus infusion 1,000 mL (1,000 mL IntraVENous New Bag 6/1/21 1556)   iopamidoL (ISOVUE-370) 76 % injection 100 mL (100 mL IntraVENous Given 6/1/21 1642)       IMPRESSION:  1. Abdominal pain, RLQ        PLAN:  Follow-up Information     Follow up With Specialties Details Why Contact Info    Vicente Sosa MD Internal Medicine   [address] [phone]      Margaret Eckert MD Gastroenterology Schedule an appointment as soon as possible for a visit  GI specialist as shceduled in 2 days.  [address]          Current Discharge Medication List

## 2021-06-01 NOTE — LETTER
[address] 
[facility] EMERGENCY CTR 
[address] [address] [phone] Work/School Note Date: 6/1/2021 To Whom It May concern: 
 
Rocío Sigala was seen and treated today in the emergency room by the following provider(s): 
Attending Provider: Lauro Burgos MD 
Physician Assistant: Reji Richardson PA-C. Rocío Sigala may return to work on 6/2/2021. Sincerely, Bassem Luevano

## 2021-06-01 NOTE — ED TRIAGE NOTES
Triage: pt c/o intermittent RLQ starting last night around 1930 with increased N/V/D. Denies rectal bleeding, fever, urinary symptoms.

## 2021-06-02 ENCOUNTER — PATIENT OUTREACH (OUTPATIENT)
Dept: OTHER | Age: 21
End: 2021-06-02

## 2021-06-02 NOTE — PROGRESS NOTES
Initial HPRP:   Patient on report as discharged from [facility] ED Visit 6/1/21 for Abdominal Pain. Initial attempt to contact patient for transitions of care.  Left discreet message on voicemail with this Care Coordinator's contact information.  Will attempt outreach on 6/3/21.      Call 911 anytime you think you may need emergency care. For example, call if:   You passed out (lost consciousness). You pass maroon or very bloody stools. You vomit blood or what looks like coffee grounds. You have new, severe belly pain. Call your doctor now or seek immediate medical care if:  Your pain gets worse, especially if it becomes focused in one area of your belly. You have a new or higher fever. Your stools are black and look like tar, or they have streaks of blood. You have unexpected vaginal bleeding. You have symptoms of a urinary tract infection. These may include:  Pain when you urinate. Urinating more often than usual.  Blood in your urine. You are dizzy or lightheaded, or you feel like you may faint. You are not getting better after 1 day (24 hours).

## 2021-06-03 ENCOUNTER — PATIENT OUTREACH (OUTPATIENT)
Dept: OTHER | Age: 21
End: 2021-06-03

## 2021-06-03 NOTE — LETTER
6/3/2021 10:27 AM 
 
Mr. Luis Eduardo Cummings 
[address] [address] Dear Luis Eduardo Cummings My name is Jamia Hung, Associate Care Coordinator for [facility] and I have been trying to reach you. The Associate Care Management (ACM) program is a free-of-charge confidential service provided to our associates and their family members covered by the [facility]. The program will provide an associate and his/her family with the [facility] expertise to assist in navigating the health care delivery system, provider services, and their overall care needsso as to assure and improve health care interactions and enhance the quality of life. This program is designed to provide you with the opportunity to have a [facility] partner with you for the following services: 
 
 1) when you come home from the hospital or emergency room 2) when help is needed to manage your disease 3) when you need assistance coordinating services or appointments 4) when you need additional education, resources or assistance reaching your Be Well Health Program goals/requirements such as Be Well With Diabetes [facility] is dedicated to empowering the good health of its community and improving the quality of care and care experiences for associates and their families. We are committed to safeguarding patient confidentiality and privacy, assuring that every associate has the respect he or she deserves in managing their health. The information shared with your care manager will not be shared with anyone else aside from those you identify as part of your care team, and will only be used to assist you with any identified care needs. Please contact me if you would like this service provided to you. Sincerely, 
 
Mara Paniagua LPN  [place] Care Coordinator Associate Care Management [facility] [address] [phone]   [phone]  [email or web address]

## 2021-06-03 NOTE — PROGRESS NOTES
Patient identified as eligible for [address] [place] [address] services. Second telephone outreach attempted. Left discreet voicemail with this CM confidential contact information. Will send UTR letter via Mail. Next Outreach 6/18/21 f/u - ED Visit & GI Appointment.

## 2021-06-18 ENCOUNTER — PATIENT OUTREACH (OUTPATIENT)
Dept: OTHER | Age: 21
End: 2021-06-18

## 2021-06-18 NOTE — PROGRESS NOTES
HPRP f/u:  Telephone attempt to contact patient for Health Promotion and Risk Prevention. Left discreet message on voicemail with this CC contact information. Will follow for one month for transitions of care needs. Next outreach is 7/19/21 for discussion f/u - GI Appointment and Resolve Episode.

## 2021-06-22 ENCOUNTER — VIRTUAL VISIT (OUTPATIENT)
Dept: SLEEP MEDICINE | Age: 21
End: 2021-06-22
Payer: COMMERCIAL

## 2021-06-22 ENCOUNTER — TELEPHONE (OUTPATIENT)
Dept: SLEEP MEDICINE | Age: 21
End: 2021-06-22

## 2021-06-22 VITALS — WEIGHT: 230 LBS | HEIGHT: 74 IN | BODY MASS INDEX: 29.52 KG/M2

## 2021-06-22 DIAGNOSIS — F90.2 ATTENTION DEFICIT HYPERACTIVITY DISORDER (ADHD), COMBINED TYPE: ICD-10-CM

## 2021-06-22 DIAGNOSIS — F41.9 ANXIETY AND DEPRESSION: ICD-10-CM

## 2021-06-22 DIAGNOSIS — G47.419 NARCOLEPSY WITHOUT CATAPLEXY: Primary | ICD-10-CM

## 2021-06-22 DIAGNOSIS — F32.A ANXIETY AND DEPRESSION: ICD-10-CM

## 2021-06-22 DIAGNOSIS — F84.0 AUTISM SPECTRUM DISORDER: ICD-10-CM

## 2021-06-22 PROCEDURE — 99203 OFFICE O/P NEW LOW 30 MIN: CPT | Performed by: INTERNAL MEDICINE

## 2021-06-22 NOTE — PATIENT INSTRUCTIONS
217 [facility] [place]. 1668 [facility], [address] Tel.  [phone] Fax. 100 [facility] [address] [place], [address] Tel.  [phone] Fax. [phone] [address]agan Villegas Tel.  [phone] Fax. [phone] Narcolepsy: After Your Visit Your Care Instructions Everybody gets a little sleepy once in a while, during a long car ride or other times when you want to be alert. But some people cannot control their sleepiness. It is no fun to be in the middle of your workday or driving your car down the street and have an overwhelming desire to sleep. This condition is called narcolepsy. Doctors do not know what causes narcolepsy. Your doctor may ask you to keep a sleep diary for a couple of weeks. It will help you and your doctor decide on treatment. It often helps to take limited naps during the day. It also helps to create a good mood and place for nighttime sleep. Your doctor may recommend medicine to help you stay awake during the day or sleep at night. Follow-up care is a key part of your treatment and safety. Be sure to make and go to all appointments, and call your doctor if you are having problems. It's also a good idea to know your test results and keep a list of the medicines you take. How can you care for yourself at home? · Try to take 2 or 3 short naps at regular times during the day. After a nap, always give yourself time to become alert before you drive a car or do anything that might cause an accident. · Take your medicines exactly as prescribed. Call your doctor if you think you are having a problem with your medicine. You may need to try several medicines before you find the one that works best for you. · Try to improve your nighttime sleep habits. Here are a few of the things you could do: ¨ Go to bed only when you are sleepy, and get up at the same time every day, even if you do not feel rested.  This might help you sleep well the next night and the night after that. ¨ If you lie awake for longer than 15 minutes, get up, leave the bedroom, and do something quiet, such as read, until you feel sleepy again. ¨ Avoid drinking or eating anything with caffeine after 3 p.m. This includes coffee, tea, cola drinks, and chocolate. ¨ Make sure your bedroom is not too hot or too cold, and keep it quiet and dark. ¨ Make sure your mattress provides good support. · Be kind to your body: ¨ Relieve tension with exercise or a massage. ¨ Learn and do relaxation techniques. ¨ Avoid alcohol, caffeine, nicotine, and illegal drugs. They can increase your anxiety level and cause sleep problems. · Get light exercise daily. Gentle stretching, light aerobics, swimming, walking, and riding a bicycle can help to keep you going during the day and to sleep well at night. · Eat a healthy diet. You may feel better if you avoid heavy meals and eat more fruits and vegetables. · Do not use over-the-counter sleeping pills. They can make your sleep restless. · Ask your doctor if any medicines you take could cause sleepiness. For example, cold and allergy medicines can make you drowsy. · Consider joining a support group with people who have narcolepsy or other sleep problems. These groups can be a good source of tips for what to do. Also, it can be comforting to talk to people who face similar challenges. Your doctor can tell you how to contact a support group. When should you call for help? Call your doctor now or seek immediate medical care if: 
· You passed out (lost consciousness). · You cannot use your muscles. This may happen very briefly, sometimes after you laugh or are angry, and may only affect part of your body. Watch closely for changes in your health, and be sure to contact your doctor if: 
· Your sleepiness continues to get worse. Where can you learn more? Go to [employer] Enter C736 in the search box to learn more about \"Narcolepsy: After Your Visit. \"  
© 5979-8059 Healthwise, Incorporated. Care instructions adapted under license by [facility] (which disclaims liability or warranty for this information). This care instruction is for use with your licensed healthcare professional. If you have questions about a medical condition or this instruction, always ask your healthcare professional. [address] any warranty or liability for your use of this information. Content Version: 4.2.31028; Last Revised: September 15, 2009 PROPER SLEEP HYGIENE What to avoid · Do not have drinks with caffeine, such as coffee or black tea, for 8 hours before bed. · Do not smoke or use other types of tobacco near bedtime. Nicotine is a stimulant and can keep you awake. · Avoid drinking alcohol late in the evening, because it can cause you to wake in the middle of the night. · Do not eat a big meal close to bedtime. If you are hungry, eat a light snack. · Do not drink a lot of water close to bedtime, because the need to urinate may wake you up during the night. · Do not read or watch TV in bed. Use the bed only for sleeping and sexual activity. What to try · Go to bed at the same time every night, and wake up at the same time every morning. Do not take naps during the day. · Keep your bedroom quiet, dark, and cool. · Get regular exercise, but not within 3 to 4 hours of your bedtime. Meche Rad · Sleep on a comfortable pillow and mattress. · If watching the clock makes you anxious, turn it facing away from you so you cannot see the time. · If you worry when you lie down, start a worry book. Well before bedtime, write down your worries, and then set the book and your concerns aside. · Try meditation or other relaxation techniques before you go to bed. · If you cannot fall asleep, get up and go to another room until you feel sleepy. Do something relaxing.  Repeat your bedtime routine before you go to bed again. 
· Make your house quiet and calm about an hour before bedtime. Turn down the lights, turn off the TV, log off the computer, and turn down the volume on music. This can help you relax after a busy day. Drowsy Driving: The [facility] 54 cites drowsiness as a causing factor in more than 490,265 police reported crashes annually, resulting in 76,000 injuries and 1,500 deaths. Other surveys suggest 55% of people polled have driven while drowsy in the past year, 23% had fallen asleep but not crashed, 3% crashed, and 2% had and accident due to drowsy driving. Who is at risk? Young Drivers: One study of drowsy driving accidents states that 55% of the drivers were under 25 years. Of those, 75% were male. Shift Workers and Travelers: People who work overnight or travel across time zones frequently are at higher risk of experiencing Circadian Rhythm Disorders. They are trying to work and function when their body is programed to sleep. Sleep Deprived: Lack of sleep has a serious impact on your ability to pay attention or focus on a task. Consistently getting less than the average of 8 hours your body needs creates partial or cumulative sleep deprivation. Untreated Sleep Disorders: Sleep Apnea, Narcolepsy, R.L.S., and other sleep disorders (untreated) prevent a person from getting enough restful sleep. This leads to excessive daytime sleepiness and increases the risk for drowsy driving accidents by up to 7 times. Medications / Alcohol: Even over the counter medications can cause drowsiness. Medications that impair a drivers attention should have a warning label. Alcohol naturally makes you sleepy and on its own can cause accidents. Combined with excessive drowsiness its effects are amplified. Signs of Drowsy Driving: * You don't remember driving the last few miles * You may drift out of your susie * You are unable to focus and your thoughts wander  * You may yawn more often than normal 
 * You have difficulty keeping your eyes open / nodding off * Missing traffic signs, speeding, or tailgating Prevention-  
Good sleep hygiene, lifestyle and behavioral choices have the most impact on drowsy driving. There is no substitute for sleep and the average person requires 8 hours nightly. If you find yourself driving drowsy, stop and sleep. Consider the sleep hygiene tips provided during your visit as well. Medication Refill Policy: Refills for all medications require 1 week advance notice. Please have your pharmacy fax a refill request. We are unable to fax, or call in \"controled substance\" medications and you will need to pick these prescriptions up from our office. [email or web address] Activation Thank you for requesting access to Chiasma. Please follow the instructions below to securely access and download your online medical record. Chiasma allows you to send messages to your doctor, view your test results, renew your prescriptions, schedule appointments, and more. How Do I Sign Up? 1. In your internet browser, go to [email or web address]. 2. Click on the First Time User? Click Here link in the Sign In box. You will see the New Member Sign Up page. 3. Enter your Chiasma Access Code exactly as it appears below. You will not need to use this code after youve completed the sign-up process. If you do not sign up before the expiration date, you must request a new code. Chiasma Access Code: [email or web address] Expires: 2021  8:53 AM (This is the date your Chiasma access code will ) 4. Enter the last four digits of your Social Security Number (xxxx) and Date of Birth (mm/dd/yyyy) as indicated and click Submit. You will be taken to the next sign-up page. 5. Create a Chiasma ID. This will be your Chiasma login ID and cannot be changed, so think of one that is secure and easy to remember. 6. Create a Chiasma password.  You can change your password at any time. 7. Enter your Password Reset Question and Answer. This can be used at a later time if you forget your password. 8. Enter your e-mail address. You will receive e-mail notification when new information is available in [address] 9. Click Sign Up. You can now view and download portions of your medical record. 10. Click the Download Summary menu link to download a portable copy of your medical information. Additional Information If you have questions, please call [phone]. Remember, [employer] is NOT to be used for urgent needs. For medical emergencies, dial 911.

## 2021-06-22 NOTE — PROGRESS NOTES
[address] [place]. [place], [address]  Tel.  [phone]  Fax. 100 [facility] 60  [place], [address] [facility]  Tel.  [phone]  Fax. [phone] [address]  Tel.  [phone]  Fax. [phone]       Mey Huitron is a 23y.o. year old male referred by Dr. Doris Almonte for evaluation of a sleep disorder. ASSESSMENT/PLAN:      ICD-10-CM ICD-9-CM    1. Narcolepsy without cataplexy  G47.419 347.00 POLYSOMNOGRAPHY 1 NIGHT      MULTIPLE SLEEP LATENCY TEST      DRUG ABUSE PROF, URINE (SEVEN DRUGS), MS COFIRM      DRUG ABUSE PROF, URINE (SEVEN DRUGS), MS COFIRM   2. Anxiety and depression  F41.9 300.00     F32.9 311    3. Attention deficit hyperactivity disorder (ADHD), combined type  F90.2 314.01    4. BMI 29.0-29.9,adult  Z68.29 V85.25    5. Autism spectrum disorder  F84.0 299.00        Patient has a history and examination consistent with the diagnosis of probable narcolepsy. Other reasons for sleepiness including depression itself, medications and idiopathic hypersomnia were discussed. Follow-up and Dispositions    · Return for telephone follow-up after testing is completed. * Sleep testing was ordered for initial evaluation. Orders Placed This Encounter    MULTIPLE SLEEP LATENCY TEST     Standing Status:   Future     Standing Expiration Date:   9/22/2021    DRUG ABUSE PROF, URINE (SEVEN DRUGS), MS COFIRM     Standing Status:   Future     Number of Occurrences:   1     Standing Expiration Date:   12/22/2021    POLYSOMNOGRAPHY 1 NIGHT     Order Specific Question:   Reason for Exam     Answer:   DILEEP     * The patient currently has a Minimal risk for having sleep apnea. STOP-BANG score 2.  * Since narcolepsy may present in this manner testing is advised. * PSG / MSLT was ordered for initial evaluation of narcolepsy. Testing protocol including need for urine drug screen reviewed.   * The need for cancelling the daytime testing in the event of certain night time findings was discussed. * Effect of medications on testing was discussed. * He would like to continue current therapies during the testing period. * Patient is aware of the need to avoid driving or performing tasks requiring a high degree of vigilance in the presence of unintentional sleep attacks. * The patient was counseled regarding proper sleep hygiene, with emphasis on ensuring sufficient total sleep time; safe driving and the benefits of exercise and weight loss. * All of his questions were addressed. SUBJECTIVE/OBJECTIVE:    Trish Mejía is an 23 y.o. male referred for evaluation for a sleep disorder. He complains of excessive daytime sleepiness associated not feeling rested on waking and need for additional sleep. Symptoms began several years ago, gradually worsening since that time. He usually gets in bed by midnight and falls asleep in 60 to 120 minutes. He lays awake in bed day dreaming until sleep onset. Family or house members do not note snoring, snorting, choking, periods of not breathing. He denies of symptoms indicative of cataplexy, sleep paralysis or sleep related hallucinations. He denies of a history of unusual movements occurring during sleep. Trish Mejía does wake up frequently at night. He is not bothered by waking up too early and left unable to get back to sleep. He actually sleeps about 12 hours at night and wakes up about 3 times during the night. He does not work shifts: [employer] Randitu Mann indicates he does not get too little sleep at night. His bedtime is 0000. He awakens at 1200. He does not take naps. He has the following observed behaviors: Grinding teeth, Sleep talking;  . Other remarks: The patient has not undergone diagnostic testing for the current problems.      Review of Systems:  Constitutional:  No significant weight loss or weight gain  Eyes:  blurred vision on waking resolves with rubbing  CVS:  No significant chest pain  Pulm:  No significant shortness of breath  GI:  No significant nausea or vomiting  :  No significant nocturia  Musculoskeletal:  No significant joint pain at night  Skin:  No significant rashes  Neuro:  No significant dizziness   Psych: Active mood issues    Sleep Review of Systems: notable for Positive difficulty falling asleep; Negative awakenings at night; Positive perceived regular dreaming; Negative nightmares; Negative  early morning headaches; Negative  memory problems; Negative  concentration issues; Positive caffeine;  Negative alcohol;   Negative history of any automobile or occupational accidents due to daytime drowsiness. [place] Sleepiness Score: 0   and Modified F.O.S.Q. Score Total / 2: 13.5    Patient-Reported Vitals 6/22/2021   Patient-Reported Weight 230lb       Physical Exam completed by visual and auditory observation of patient with verbal input from patient. General:   Alert, oriented, not in acute distress   Eyes:  Anicteric Sclerae; no obvious strabismus   Nose:  No obvious nasal septum deviation    Neck:   Midline trachea, no visible mass   Chest/Lungs:  Respiratory effort normal, no visualized signs of difficulty breathing or respiratory distress   CVS:  No JVD   Extremities:  No obvious rashes noted on face, neck, or hands   Neuro:  No facial asymmetry, no focal deficits; no obvious tremor    Psych:  Normal affect,  normal countenance     Deb Carrillo is being evaluated by a Virtual Visit (video visit) encounter to address concerns as mentioned above. A caregiver was present when appropriate. Due to this being a TeleHealth encounter (During XVARG-74 public health emergency), evaluation of the following organ systems was limited: Vitals/Constitutional/EENT/Resp/CV/GI//MS/Neuro/Skin/Heme-Lymph-Imm.   Pursuant to the emergency declaration under the [address] and Response Supplemental Appropriations Act, this Virtual Visit was conducted with patient's (and/or legal guardian's) consent, to reduce the patient's risk of exposure to COVID-19 and provide necessary medical care. Patient identification was verified at the start of the visit: YES using name and date of birth. Patient's phone number [phone] (home)  was confirmed for accuracy. He gives permission for messages regarding results and appointments to be left at that number. Services were provided through a video synchronous discussion virtually to substitute for in-person clinic visit. I was in the office while conducting this encounter, patient located at their home or alternate location of their choice. An electronic signature was used to authenticate this note. Wyatt Rivera MD, FAASM  Diplomate American Board of Sleep Medicine  Diplomate in Sleep Medicine - ABP    Electronically signed.  06/22/21

## 2021-07-19 ENCOUNTER — PATIENT OUTREACH (OUTPATIENT)
Dept: OTHER | Age: 21
End: 2021-07-19

## 2021-07-19 NOTE — LETTER
7/19/2021 9:16 AM    Mr. Renny Stubbs  [address] [place] [address]    Dear Renny Stubbs    My name is Daquan Colon,  Associate Care Coordinator for [facility] [place], and I have been trying to reach you. The Associate Care Management (ACM) program is a free-of-charge, confidential service provided to our employees and their family members covered by the [facility]. I can help you with care transitions such as when you come home from the hospital, when help is needed to manage your disease, or when you need assistance coordinating services or appointments. As healthcare providers, we know that patients do better when they have close follow up with a primary care provider (PCP). I can help you find one that is convenient to you and covered by your insurance. I can also help you understand any after visit instructions, such as what symptoms to watch out for, or any new or changed medications. We can work together using your preferred communication -- telephone, email, "[employer]hart. If you do not have a [employer] account, I can help you request access. Our program is designed to provide you with the opportunity to have a [employer] care manager partner with you for your healthcare needs. Due to not being able to reach you, I am closing out the current program, but will remain available to you should you have any questions. Please contact me at the below number if I can provide you with assistance for any of the above services.     Sincerely,    Justin Concepcion LPN  [place] Care Coordinator  Associate Care Management  [facility]  [address] [phone]  JACKIE [phone]  [email or web address]

## 2021-08-01 ENCOUNTER — HOSPITAL ENCOUNTER (OUTPATIENT)
Dept: SLEEP MEDICINE | Age: 21
Discharge: HOME | End: 2021-08-01
Payer: COMMERCIAL

## 2021-08-01 PROCEDURE — 95810 POLYSOM 6/> YRS 4/> PARAM: CPT | Performed by: INTERNAL MEDICINE

## 2021-08-02 ENCOUNTER — HOSPITAL ENCOUNTER (OUTPATIENT)
Dept: SLEEP MEDICINE | Age: 21
Discharge: HOME | End: 2021-08-02
Payer: COMMERCIAL

## 2021-08-02 VITALS
OXYGEN SATURATION: 98 % | DIASTOLIC BLOOD PRESSURE: 82 MMHG | SYSTOLIC BLOOD PRESSURE: 124 MMHG | BODY MASS INDEX: 29.52 KG/M2 | TEMPERATURE: 98.6 F | WEIGHT: 230 LBS | HEART RATE: 98 BPM | HEIGHT: 74 IN

## 2021-08-02 VITALS
DIASTOLIC BLOOD PRESSURE: 82 MMHG | OXYGEN SATURATION: 99 % | SYSTOLIC BLOOD PRESSURE: 124 MMHG | HEART RATE: 98 BPM | TEMPERATURE: 98.6 F | HEIGHT: 74 IN | BODY MASS INDEX: 29.52 KG/M2 | WEIGHT: 230 LBS

## 2021-08-02 DIAGNOSIS — G47.419 NARCOLEPSY WITHOUT CATAPLEXY: ICD-10-CM

## 2021-08-02 PROCEDURE — 95805 MULTIPLE SLEEP LATENCY TEST: CPT | Performed by: INTERNAL MEDICINE

## 2021-08-03 LAB
AMPHETAMINES UR QL SCN: NEGATIVE NG/ML
BARBITURATES UR QL SCN: NEGATIVE NG/ML
BENZODIAZ UR QL: NEGATIVE NG/ML
BZE UR QL: NEGATIVE NG/ML
CANNABINOIDS UR QL SCN: NEGATIVE NG/ML
OPIATES UR QL: NEGATIVE NG/ML
PCP UR QL: NEGATIVE NG/ML

## 2021-08-04 ENCOUNTER — TELEPHONE (OUTPATIENT)
Dept: SLEEP MEDICINE | Age: 21
End: 2021-08-04

## 2021-08-04 DIAGNOSIS — G47.39 MIXED SLEEP APNEA: Primary | ICD-10-CM

## 2021-08-04 NOTE — TELEPHONE ENCOUNTER
Lanie Prakash is to be contacted by lead sleep technologist regarding results of Sleep Testing which was indicative of an average AHI of 23.4 per hour with an SpO2 tej of 77%, the duration of SpO2 <88% was 4.5 minutes. Daytime testing (MSLT) did not indicate presence of significant daytime sleepiness. * A second polysomnogram has been ordered for mask fitting, PAP desensitizing protocol, and pressure titration. * Follow-up appointment will be scheduled 8-12 weeks following PAP initiation to gauge treatment response and compliance. Encounter Diagnosis   Name Primary?     Mixed sleep apnea Yes       Orders Placed This Encounter    SLEEP LAB (PAP TITRATION)     Standing Status:   Future     Standing Expiration Date:   11/4/2021     Order Specific Question:   Reason for Exam     Answer:   DILEEP

## 2021-08-09 ENCOUNTER — DOCUMENTATION ONLY (OUTPATIENT)
Dept: SLEEP MEDICINE | Age: 21
End: 2021-08-09

## 2021-08-31 ENCOUNTER — TELEPHONE (OUTPATIENT)
Dept: SLEEP MEDICINE | Age: 21
End: 2021-08-31

## 2021-08-31 NOTE — TELEPHONE ENCOUNTER
Reviewed sleep study results with patient. He expressed understanding and is willing to proceed with a CPAP Titration. Please schedule titration study. Pt has been vaccinated.     Thanks

## 2021-09-03 ENCOUNTER — TELEPHONE (OUTPATIENT)
Dept: SLEEP MEDICINE | Age: 21
End: 2021-09-03

## 2021-10-06 ENCOUNTER — HOSPITAL ENCOUNTER (OUTPATIENT)
Dept: SLEEP MEDICINE | Age: 21
Discharge: HOME | End: 2021-10-06
Payer: COMMERCIAL

## 2021-10-06 PROCEDURE — 95811 POLYSOM 6/>YRS CPAP 4/> PARM: CPT | Performed by: INTERNAL MEDICINE

## 2021-10-07 VITALS
SYSTOLIC BLOOD PRESSURE: 131 MMHG | HEIGHT: 74 IN | OXYGEN SATURATION: 96 % | WEIGHT: 230 LBS | TEMPERATURE: 98.7 F | HEART RATE: 89 BPM | DIASTOLIC BLOOD PRESSURE: 78 MMHG | BODY MASS INDEX: 29.52 KG/M2

## 2021-10-07 NOTE — PROGRESS NOTES
[address] [facility]., [place], [address]  Tel.  [phone]  Fax. 100 [facility] 60  [place], [address] [facility]  Tel.  [phone]  Fax. [phone] [address]  Tel.  [phone]  Fax. [phone]     Sleep Study Technical Notes        PRE-Test:  Jeancarlos Andrews (: 2000) And his mother arrived in the lobby. Patient was greeted, temperature checked (98.7) and screening questions asked. The patients mother went home and he was taken directly to his room. Weight per patient (230lbs). BP (131/78) and SpO2 (96%) noted. Procedure explained and questions were answered. The patient expressed understanding. Electrodes were applied without incident. Acquisition Notes: :  CPAP was started at 5cm H2O, and he was titrated to 7cm H2O for hypopneas. The patient was able to enter REM. Other than some transitional and post-arousal centrals apneas, respiratory events were eliminated at the final pressure.  Lights off: 9:45pm   Respiratory events: Hypopneas/centrals  ECG: normal   CPAP titration: 7cm H2O   Mask(s) Used: Airfit N20-medium   Other comments:   o Patient watched TV or on phone after lights out for 1 hour      POST Test:   Patient was awakened. Electrodes were removed. He was discharged after answering the Post Questionnaire. Patient stated that he was alert and ok, and went to the lobby to meet his mother. Equipment and room cleaned per infection control policy.

## 2021-10-11 ENCOUNTER — DOCUMENTATION ONLY (OUTPATIENT)
Dept: SLEEP MEDICINE | Age: 21
End: 2021-10-11

## 2021-10-11 ENCOUNTER — TELEPHONE (OUTPATIENT)
Dept: SLEEP MEDICINE | Age: 21
End: 2021-10-11

## 2021-10-11 DIAGNOSIS — G47.31 COMPLEX SLEEP APNEA SYNDROME: Primary | ICD-10-CM

## 2021-10-11 NOTE — TELEPHONE ENCOUNTER
Brigette Thornton is to be contacted by lead sleep technologist regarding results of Sleep Testing which was indicative of an average Apnea/Hypopnea index was 15.7 on PAP therapy. A total of 104 were scored on CPAP of which 98 were central apneas. * A second polysomnogram has been ordered for mask fitting, PAP desensitizing protocol, and ASV titration. * Follow-up appointment will be scheduled 8-12 weeks following PAP initiation to gauge treatment response and compliance. Encounter Diagnosis   Name Primary?  Complex sleep apnea syndrome Yes       Orders Placed This Encounter    SLEEP LAB (PAP TITRATION)     Standing Status:   Future     Standing Expiration Date:   4/11/2022     Scheduling Instructions:      Perform ASV Titration using ResMed Easy Care Tx:      Rate: Auto;      EPAP: 6 cmH2O titrate to treat obstructive apnea. Maximum PS: 15 cmH2O; Minimum PS: 03 cmH2O.      Order Specific Question:   Reason for Exam     Answer:   CSA

## 2021-10-18 NOTE — TELEPHONE ENCOUNTER
Results sent via message in [facility]. Please schedule covid 19 testing and ASV titration study.     Thanks

## 2021-10-27 DIAGNOSIS — G47.33 OSA (OBSTRUCTIVE SLEEP APNEA): Primary | ICD-10-CM

## 2021-10-27 NOTE — PROGRESS NOTES
COVID order entered. Orders Placed This Encounter    NOVEL CORONAVIRUS (COVID-19)     Scheduling Instructions:      1) Due to current limited availability of the COVID-19 PCR test, tests will be prioritized and may not be completed.              2) Order only if the test result will change clinical management or necessary for a return to mission-critical employment decision.              3) Print and instruct patient to adhere to CDC home isolation program. (Link Above)              4) Set up or refer patient for a monitoring program.              5) Have patient sign up for and leverage [employer] (if not previously done). Order Specific Question:   Is this test for diagnosis or screening? Answer:   Screening     Order Specific Question:   Symptomatic for COVID-19 as defined by CDC? Answer:   Unknown     Order Specific Question:   Hospitalized for COVID-19? Answer:   Unknown     Order Specific Question:   Admitted to ICU for COVID-19? Answer:   Unknown     Order Specific Question:   Employed in healthcare setting? Answer:   Unknown     Order Specific Question:   Resident in a congregate (group) care setting? Answer:   Unknown     Order Specific Question:   Previously tested for COVID-19?      Answer:   Unknown     ROSIE Flower, [facility]   Nurse Practitioner  [address]

## 2021-11-04 ENCOUNTER — DOCUMENTATION ONLY (OUTPATIENT)
Dept: SLEEP MEDICINE | Age: 21
End: 2021-11-04

## 2021-11-04 NOTE — PROGRESS NOTES
Left voicemail to call the office to reschedule COVID test scheduled on 11/26/2021 due to holiday closure. Patient can either go through the [facility] or be rescheduled on 11/24/2021.

## 2021-11-18 ENCOUNTER — TELEPHONE (OUTPATIENT)
Dept: SLEEP MEDICINE | Age: 21
End: 2021-11-18

## 2021-11-18 NOTE — TELEPHONE ENCOUNTER
LVM to inform the patient that our offices will be closed on Friday November 26th for Thanksgiving and he will have to go to a pharmacy for drive thru testing.

## 2021-11-23 ENCOUNTER — TELEPHONE (OUTPATIENT)
Dept: SLEEP MEDICINE | Age: 21
End: 2021-11-23

## 2021-11-23 NOTE — TELEPHONE ENCOUNTER
ADARSHM to notify the patient that due to the Thanksgiving holiday our office will be closed and he will need to go to a pharmacy to have his COVID testing performed.

## 2021-11-30 ENCOUNTER — HOSPITAL ENCOUNTER (OUTPATIENT)
Dept: SLEEP MEDICINE | Age: 21
Discharge: HOME | End: 2021-11-30
Payer: COMMERCIAL

## 2021-11-30 VITALS
TEMPERATURE: 98.4 F | DIASTOLIC BLOOD PRESSURE: 75 MMHG | SYSTOLIC BLOOD PRESSURE: 119 MMHG | BODY MASS INDEX: 29.52 KG/M2 | WEIGHT: 230 LBS | HEART RATE: 89 BPM | OXYGEN SATURATION: 97 % | HEIGHT: 74 IN

## 2021-11-30 DIAGNOSIS — G47.31 COMPLEX SLEEP APNEA SYNDROME: ICD-10-CM

## 2021-11-30 PROCEDURE — 95811 POLYSOM 6/>YRS CPAP 4/> PARM: CPT | Performed by: INTERNAL MEDICINE

## 2021-12-01 ENCOUNTER — DOCUMENTATION ONLY (OUTPATIENT)
Dept: SLEEP MEDICINE | Age: 21
End: 2021-12-01

## 2021-12-01 NOTE — PROGRESS NOTES
[address] [facility]., [facility]. [place], [address]  Tel.  [phone]  Fax. 100 [facility] 60  [place], [address] [facility]  Tel.  [phone]  Fax. [phone] [address] [facility] [place]  Tel.  [phone]  Fax. [phone]     Sleep Study Technical Notes        PRE-Test:  Renetta Godinez (: 2000) arrived in the lobby. Patient was greeted, temperature checked (98.4 ) and screening questions asked. The patient was taken to the Sleep Center and taken directly to his/her room. BP (119/75) and SaO2 (97) were taken. Procedure explained to the patient and questions were answered. The patient expressed understanding of the procedure. Electrodes were applied without incident. The patient was placed in bed and the study was started. Acquisition Notes:   Respiratory events: Hypopneas, central events   ECG:  NSR   Snoring:  None    PAP titration: ASV   Desensitization Mask(s) Used: ResMed  AirTouch N20 medium   Other comments: 1:1  o Patient had caregiver in attendance:  No  o Patient watched TV or on phone after lights out for 0 hours  o Patient slept with positional therapy:  No  o Patient slept with head of bed elevated:  No  o Patient wore an oral appliance:  No  o Patient to bathroom 0 times      POST Test:   Patient was awakened. Electrodes were removed. The patient was discharged after answering the Post Questionnaire. Patient stated that he was alert and ok to drive.  Equipment and room cleaned per infection control policy.

## 2021-12-10 ENCOUNTER — DOCUMENTATION ONLY (OUTPATIENT)
Dept: SLEEP MEDICINE | Age: 21
End: 2021-12-10

## 2021-12-13 ENCOUNTER — TELEPHONE (OUTPATIENT)
Dept: SLEEP MEDICINE | Age: 21
End: 2021-12-13

## 2021-12-13 DIAGNOSIS — G47.31 COMPLEX SLEEP APNEA SYNDROME: Primary | ICD-10-CM

## 2021-12-13 NOTE — TELEPHONE ENCOUNTER
Reviewed sleep study results with patient. He expressed understanding and is willing to proceed with a trial of ASV. Please order ASV device. Fax DME order & Schedule 1st adherence visit in 60 to 90 days.

## 2021-12-14 NOTE — TELEPHONE ENCOUNTER
Orders Placed This Encounter    AMB SUPPLY ORDER     Diagnosis: Complex Sleep Apnea G47.37    Positive Airway Pressure Therapy: Duration of need: 99 months. ResMed ASV  with Heated Humidifier :    Min EPAP:  06 cmH2O / Max EPAP: 09 cmH2O;  Min PS:       03 cmH2O / Max PS:     15 cmH2O;  Back up Rate: Auto;     Full Face Mask 1 every 3 months.  Full Face Mask Cushion 1 per month.  Headgear 1 every 6 months.  Tubing 1 every 3 months.  Filter(s) Non-Disposable 1 every 6 months. Sylvia Rondon MD, FAASM; NPI: [id number]  Electronically signed.  12/14/21

## 2021-12-15 ENCOUNTER — DOCUMENTATION ONLY (OUTPATIENT)
Dept: SLEEP MEDICINE | Age: 21
End: 2021-12-15

## 2021-12-15 NOTE — TELEPHONE ENCOUNTER
[facility] ((faxed order for supplies and new device))

## 2022-03-16 ENCOUNTER — TELEPHONE (OUTPATIENT)
Dept: SLEEP MEDICINE | Age: 22
End: 2022-03-16

## 2022-03-19 PROBLEM — F90.2 ATTENTION DEFICIT HYPERACTIVITY DISORDER (ADHD), COMBINED TYPE: Status: ACTIVE | Noted: 2017-04-28

## 2022-06-27 ENCOUNTER — VIRTUAL VISIT (OUTPATIENT)
Dept: PRIMARY CARE CLINIC | Age: 22
End: 2022-06-27

## 2022-06-27 DIAGNOSIS — R11.2 NAUSEA AND VOMITING, UNSPECIFIED VOMITING TYPE: Primary | ICD-10-CM

## 2022-06-27 DIAGNOSIS — R51.9 FREQUENT HEADACHES: ICD-10-CM

## 2022-06-27 DIAGNOSIS — R50.9 FEVER AND CHILLS: ICD-10-CM

## 2022-06-27 PROCEDURE — 99213 OFFICE O/P EST LOW 20 MIN: CPT | Performed by: INTERNAL MEDICINE

## 2022-06-27 NOTE — LETTER
NOTIFICATION RETURN TO WORK / SCHOOL    6/27/2022 10:50 AM    Mr. Marlen Stock  [address] [address]      To Whom It May Concern:    Marlen Stock is currently under the care of Radha Stoll. Patient has been feeling sick with stomach flu. We recommend he stay home for today and tomorrow. He will return to work/school on: 06/29/22    If there are questions or concerns please have the patient contact our office.         Sincerely,      Deneen Casey MD

## 2022-06-27 NOTE — PROGRESS NOTES
Cathryn Fernandes (: 2000) is a 24 y.o. male, established patient, here for evaluation of the following chief complaint(s):   Fever     Written by Margareth Zimmerman, as dictated by Dr. Doreen Murdock MD.      ASSESSMENT/PLAN:  Below is the assessment and plan developed based on review of pertinent history, labs, studies, and medications. 1. Nausea and vomiting, unspecified vomiting type  Most likely secondary to stomach flu. Recommend staying well hydrated and [place] for nausea. He does not think he needs a medication for nausea. 2. Fever and chills  Most likely secondary to stomach flu. Continue with otc ibuprofen prn.     3. Frequent headaches  Most likely secondary to stomach flu. Continue with otc ibuprofen prn. SUBJECTIVE/OBJECTIVE:  HPI   The patient presents virtually today c/o fever, chills, headache, and body aches x1 day. Also notes that he has been feeling nauseous and vomited. He took an ibuprofen which helped. He took a home COVID test which was negative. He has received COVID shots, but not the booster. He is followed by Psychiatry for ADHD and depression. He feels like medications are working well and denies depressive symptoms. Patient Active Problem List   Diagnosis Code    Attention deficit hyperactivity disorder (ADHD), combined type F90.2    Depression F32. A        Current Outpatient Medications on File Prior to Visit   Medication Sig Dispense Refill    fluticasone propionate (Flonase Allergy Relief) 50 mcg/actuation nasal spray 2 Sprays by Both Nostrils route daily. (Patient not taking: Reported on 2021)      ARIPiprazole (ABILIFY) 2 mg tablet Take 15 mg by mouth every evening. (Patient not taking: Reported on 2021)      busPIRone (BUSPAR) 10 mg tablet Take 10 mg by mouth three (3) times daily.  DULoxetine (CYMBALTA) 60 mg capsule Take 150 Capsules by mouth daily.       methylphenidate ER 54 mg 24 hr tab Take one tablet by mouth daily 30 Tab 0    fexofenadine-pseudoephedrine (ALLEGRA-D 12 HOUR)  mg Tb12 Take 1 Tab by mouth every twelve (12) hours. (Patient not taking: Reported on 6/22/2021)       No current facility-administered medications on file prior to visit. No Known Allergies    Past Medical History:   Diagnosis Date    ADHD (attention deficit hyperactivity disorder)     Anxiety     Autism spectrum disorder     Depression        Past Surgical History:   Procedure Laterality Date    HX HEENT      wisdom teeth    HX OTHER SURGICAL  03/01/2018    cauterization of nose        No family history on file. Social History     Socioeconomic History    Marital status: SINGLE     Spouse name: Not on file    Number of children: Not on file    Years of education: Not on file    Highest education level: Not on file   Occupational History    Not on file   Tobacco Use    Smoking status: Never Smoker    Smokeless tobacco: Never Used   Substance and Sexual Activity    Alcohol use: No    Drug use: No    Sexual activity: Not Currently     Birth control/protection: None   Other Topics Concern    Not on file   Social History Narrative    Not on file       No visits with results within 3 Month(s) from this visit. Latest known visit with results is:   Virtual Visit on 06/22/2021   Component Date Value Ref Range Status    Amphetamines, urine 08/02/2021 Negative  Jmrunf=2409 ng/mL Final    Amphetamine test includes Amphetamine and Methamphetamine.  Barbiturates 08/02/2021 Negative  Bcwdzz=951 ng/mL Final    Benzodiazepines 08/02/2021 Negative  Olwltf=743 ng/mL Final    Cannabinoids 08/02/2021 Negative  Cutoff=50 ng/mL Final    Cocaine 08/02/2021 Negative  Vewvjx=396 ng/mL Final    Opiates 08/02/2021 Negative  Isiypx=729 ng/mL Final    Opiate test includes Codeine and Morphine only.  Phencyclidine 08/02/2021 Negative  Cutoff=25 ng/mL Final      Review of Systems   Constitutional: Positive for chills and fever.  Negative for activity change, fatigue and unexpected weight change. HENT: Negative for congestion, ear discharge, ear pain, hearing loss, rhinorrhea and sore throat. Eyes: Negative for pain, discharge and redness. Respiratory: Negative for cough, chest tightness and shortness of breath. Cardiovascular: Negative for leg swelling. Gastrointestinal: Positive for nausea and vomiting. Negative for abdominal pain, constipation and diarrhea. Endocrine: Negative for polyuria. Genitourinary: Negative for dysuria, flank pain and urgency. Musculoskeletal: Positive for myalgias. Negative for arthralgias and back pain. Skin: Negative for color change. Neurological: Negative for dizziness, light-headedness and headaches. Psychiatric/Behavioral: Negative for dysphoric mood and sleep disturbance. The patient is not nervous/anxious.         Physical Exam    [INSTRUCTIONS:  \"[x]\" Indicates a positive item  \"[]\" Indicates a negative item  -- DELETE ALL ITEMS NOT EXAMINED]    Constitutional: [x] Appears well-developed and well-nourished [x] No apparent distress      [] Abnormal -     Mental status: [x] Alert and awake  [x] Oriented to person/place/time [x] Able to follow commands    [] Abnormal -     Eyes:   EOM    [x]  Normal    [] Abnormal -   Sclera  [x]  Normal    [] Abnormal -          Discharge [x]  None visible   [] Abnormal -     HENT: [x] Normocephalic, atraumatic  [] Abnormal -   [x] Mouth/Throat: Mucous membranes are moist    External Ears [x] Normal  [] Abnormal -    Neck: [x] No visualized mass [] Abnormal -     Pulmonary/Chest: [x] Respiratory effort normal   [x] No visualized signs of difficulty breathing or respiratory distress        [] Abnormal -      Musculoskeletal:   [x] Normal gait with no signs of ataxia         [x] Normal range of motion of neck        [] Abnormal -     Neurological:        [x] No Facial Asymmetry (Cranial nerve 7 motor function) (limited exam due to video visit)          [x] No gaze palsy [] Abnormal -          Skin:        [x] No significant exanthematous lesions or discoloration noted on facial skin         [] Abnormal -            Psychiatric:       [x] Normal Affect [] Abnormal -        [x] No Hallucinations    Other pertinent observable physical exam findings:-    Lajuanda Lundborg, was evaluated through a synchronous (real-time) audio-video encounter. The patient (or guardian if applicable) is aware that this is a billable service, which includes applicable co-pays. This Virtual Visit was conducted with patient's (and/or legal guardian's) consent. The visit was conducted pursuant to the emergency declaration under the [address] [place] [address], [address] [place] [address] authority and the [employer] General Act. Patient identification was verified, and a caregiver was present when appropriate. The patient was located at: Home: [place] [address]  The provider was located at: [facility] [address] [place] [address]       An electronic signature was used to authenticate this note.   -- Jazz Patrick

## 2022-11-16 ENCOUNTER — TELEPHONE (OUTPATIENT)
Dept: PRIMARY CARE CLINIC | Age: 22
End: 2022-11-16

## 2022-11-16 DIAGNOSIS — K64.9 BLEEDING HEMORRHOID: Primary | ICD-10-CM

## 2022-11-16 RX ORDER — HYDROCORTISONE 25 MG/G
CREAM TOPICAL 3 TIMES DAILY
Qty: 30 G | Refills: 0 | Status: SHIPPED | OUTPATIENT
Start: 2022-11-16

## 2022-11-16 NOTE — TELEPHONE ENCOUNTER
Told pt that Dr. Adal Cates sent over hydrocortisone cream to [facility] on [address] and needs to use it 2-3 times a day. Pt said does she think its hemorrhoids and I said yes and he said ok, I thought so too just wanted to be sure. I said ok, let us know in a few days if it isn't better. Pt asked how will I know? Then he said the pain will go away and I said yes and the burning. He said ok.

## 2022-11-16 NOTE — TELEPHONE ENCOUNTER
Patient said for the past few days his abdominal area has been hurting and he has a burning sensation on his anal area. Patient said today he saw bright red blood in the toilet after having a bowel movement. Patient asked for a nurse or Dr. Pollo Parra to call him back.

## 2023-10-31 ENCOUNTER — NURSE ONLY (OUTPATIENT)
Dept: PRIMARY CARE CLINIC | Facility: CLINIC | Age: 23
End: 2023-10-31
Payer: COMMERCIAL

## 2023-10-31 DIAGNOSIS — Z11.1 ENCOUNTER FOR PPD TEST: Primary | ICD-10-CM

## 2023-10-31 PROCEDURE — 86580 TB INTRADERMAL TEST: CPT | Performed by: INTERNAL MEDICINE

## 2023-10-31 RX ORDER — GABAPENTIN 300 MG/1
CAPSULE ORAL
COMMUNITY
Start: 2023-10-20

## 2023-10-31 RX ORDER — LITHIUM CARBONATE 300 MG/1
CAPSULE ORAL
COMMUNITY
Start: 2023-10-20

## 2023-11-02 LAB
MM INDURATION, POC: 0 MM (ref 0–5)
PPD, POC: NEGATIVE

## 2023-11-10 ENCOUNTER — OFFICE VISIT (OUTPATIENT)
Dept: PRIMARY CARE CLINIC | Facility: CLINIC | Age: 23
End: 2023-11-10

## 2023-11-10 VITALS
HEIGHT: 74 IN | RESPIRATION RATE: 18 BRPM | SYSTOLIC BLOOD PRESSURE: 130 MMHG | DIASTOLIC BLOOD PRESSURE: 76 MMHG | BODY MASS INDEX: 34.27 KG/M2 | WEIGHT: 267 LBS | TEMPERATURE: 97.1 F | HEART RATE: 101 BPM | OXYGEN SATURATION: 97 %

## 2023-11-10 DIAGNOSIS — Z11.59 NEED FOR HEPATITIS B SCREENING TEST: ICD-10-CM

## 2023-11-10 DIAGNOSIS — Z11.4 ENCOUNTER FOR SCREENING FOR HIV: ICD-10-CM

## 2023-11-10 DIAGNOSIS — F31.9 BIPOLAR DISORDER WITH DEPRESSION (HCC): ICD-10-CM

## 2023-11-10 DIAGNOSIS — R53.83 FATIGUE, UNSPECIFIED TYPE: ICD-10-CM

## 2023-11-10 DIAGNOSIS — Z11.59 NEED FOR HEPATITIS C SCREENING TEST: ICD-10-CM

## 2023-11-10 DIAGNOSIS — Z00.00 PHYSICAL EXAM: Primary | ICD-10-CM

## 2023-11-10 DIAGNOSIS — Z23 NEEDS FLU SHOT: ICD-10-CM

## 2023-11-10 DIAGNOSIS — Z00.00 PHYSICAL EXAM: ICD-10-CM

## 2023-11-10 SDOH — ECONOMIC STABILITY: INCOME INSECURITY: HOW HARD IS IT FOR YOU TO PAY FOR THE VERY BASICS LIKE FOOD, HOUSING, MEDICAL CARE, AND HEATING?: SOMEWHAT HARD

## 2023-11-10 SDOH — ECONOMIC STABILITY: HOUSING INSECURITY
IN THE LAST 12 MONTHS, WAS THERE A TIME WHEN YOU DID NOT HAVE A STEADY PLACE TO SLEEP OR SLEPT IN A SHELTER (INCLUDING NOW)?: NO

## 2023-11-10 SDOH — ECONOMIC STABILITY: FOOD INSECURITY: WITHIN THE PAST 12 MONTHS, THE FOOD YOU BOUGHT JUST DIDN'T LAST AND YOU DIDN'T HAVE MONEY TO GET MORE.: NEVER TRUE

## 2023-11-10 SDOH — ECONOMIC STABILITY: FOOD INSECURITY: WITHIN THE PAST 12 MONTHS, YOU WORRIED THAT YOUR FOOD WOULD RUN OUT BEFORE YOU GOT MONEY TO BUY MORE.: NEVER TRUE

## 2023-11-10 ASSESSMENT — PATIENT HEALTH QUESTIONNAIRE - PHQ9
SUM OF ALL RESPONSES TO PHQ QUESTIONS 1-9: 17
10. IF YOU CHECKED OFF ANY PROBLEMS, HOW DIFFICULT HAVE THESE PROBLEMS MADE IT FOR YOU TO DO YOUR WORK, TAKE CARE OF THINGS AT HOME, OR GET ALONG WITH OTHER PEOPLE: 3
7. TROUBLE CONCENTRATING ON THINGS, SUCH AS READING THE NEWSPAPER OR WATCHING TELEVISION: 3
8. MOVING OR SPEAKING SO SLOWLY THAT OTHER PEOPLE COULD HAVE NOTICED. OR THE OPPOSITE, BEING SO FIGETY OR RESTLESS THAT YOU HAVE BEEN MOVING AROUND A LOT MORE THAN USUAL: 0
4. FEELING TIRED OR HAVING LITTLE ENERGY: 2
9. THOUGHTS THAT YOU WOULD BE BETTER OFF DEAD, OR OF HURTING YOURSELF: 0
3. TROUBLE FALLING OR STAYING ASLEEP: 2
SUM OF ALL RESPONSES TO PHQ9 QUESTIONS 1 & 2: 6
2. FEELING DOWN, DEPRESSED OR HOPELESS: 3
1. LITTLE INTEREST OR PLEASURE IN DOING THINGS: 3
5. POOR APPETITE OR OVEREATING: 3
SUM OF ALL RESPONSES TO PHQ QUESTIONS 1-9: 17
6. FEELING BAD ABOUT YOURSELF - OR THAT YOU ARE A FAILURE OR HAVE LET YOURSELF OR YOUR FAMILY DOWN: 1

## 2023-11-10 ASSESSMENT — ENCOUNTER SYMPTOMS
CONSTIPATION: 0
RHINORRHEA: 0
EYE DISCHARGE: 0
COUGH: 0
BACK PAIN: 0
SHORTNESS OF BREATH: 0
ABDOMINAL PAIN: 0
COLOR CHANGE: 0
CHEST TIGHTNESS: 0
SORE THROAT: 0

## 2023-11-10 ASSESSMENT — COLUMBIA-SUICIDE SEVERITY RATING SCALE - C-SSRS
7. DID THIS OCCUR IN THE LAST THREE MONTHS: NO
5. HAVE YOU STARTED TO WORK OUT OR WORKED OUT THE DETAILS OF HOW TO KILL YOURSELF? DO YOU INTEND TO CARRY OUT THIS PLAN?: NO
3. HAVE YOU BEEN THINKING ABOUT HOW YOU MIGHT KILL YOURSELF?: NO
4. HAVE YOU HAD THESE THOUGHTS AND HAD SOME INTENTION OF ACTING ON THEM?: NO

## 2023-11-11 LAB
ALBUMIN SERPL-MCNC: 4.3 G/DL (ref 3.5–5)
ALBUMIN/GLOB SERPL: 1.2 (ref 1.1–2.2)
ALP SERPL-CCNC: 74 U/L (ref 45–117)
ALT SERPL-CCNC: 38 U/L (ref 12–78)
ANION GAP SERPL CALC-SCNC: 6 MMOL/L (ref 5–15)
AST SERPL-CCNC: 16 U/L (ref 15–37)
BILIRUB SERPL-MCNC: 0.8 MG/DL (ref 0.2–1)
BUN SERPL-MCNC: 8 MG/DL (ref 6–20)
BUN/CREAT SERPL: 8 (ref 12–20)
CALCIUM SERPL-MCNC: 9.8 MG/DL (ref 8.5–10.1)
CHLORIDE SERPL-SCNC: 106 MMOL/L (ref 97–108)
CHOLEST SERPL-MCNC: 232 MG/DL
CO2 SERPL-SCNC: 27 MMOL/L (ref 21–32)
CREAT SERPL-MCNC: 1.01 MG/DL (ref 0.7–1.3)
ERYTHROCYTE [DISTWIDTH] IN BLOOD BY AUTOMATED COUNT: 11.6 % (ref 11.5–14.5)
GLOBULIN SER CALC-MCNC: 3.5 G/DL (ref 2–4)
GLUCOSE SERPL-MCNC: 90 MG/DL (ref 65–100)
HBV SURFACE AB SER QL: REACTIVE
HBV SURFACE AB SER-ACNC: 94.7 MIU/ML
HCT VFR BLD AUTO: 47.4 % (ref 36.6–50.3)
HCV AB SER IA-ACNC: <0.02 INDEX
HCV AB SERPL QL IA: NONREACTIVE
HDLC SERPL-MCNC: 48 MG/DL
HDLC SERPL: 4.8 (ref 0–5)
HGB BLD-MCNC: 16.4 G/DL (ref 12.1–17)
HIV 1+2 AB+HIV1 P24 AG SERPL QL IA: NONREACTIVE
HIV 1/2 RESULT COMMENT: NORMAL
LDLC SERPL CALC-MCNC: 144.6 MG/DL (ref 0–100)
MCH RBC QN AUTO: 30.1 PG (ref 26–34)
MCHC RBC AUTO-ENTMCNC: 34.6 G/DL (ref 30–36.5)
MCV RBC AUTO: 87 FL (ref 80–99)
NRBC # BLD: 0 K/UL (ref 0–0.01)
NRBC BLD-RTO: 0 PER 100 WBC
PLATELET # BLD AUTO: 153 K/UL (ref 150–400)
PMV BLD AUTO: 10.3 FL (ref 8.9–12.9)
POTASSIUM SERPL-SCNC: 4.2 MMOL/L (ref 3.5–5.1)
PROT SERPL-MCNC: 7.8 G/DL (ref 6.4–8.2)
RBC # BLD AUTO: 5.45 M/UL (ref 4.1–5.7)
SODIUM SERPL-SCNC: 139 MMOL/L (ref 136–145)
TRIGL SERPL-MCNC: 197 MG/DL
VLDLC SERPL CALC-MCNC: 39.4 MG/DL
WBC # BLD AUTO: 8 K/UL (ref 4.1–11.1)

## 2023-11-20 ENCOUNTER — APPOINTMENT (OUTPATIENT)
Facility: HOSPITAL | Age: 23
End: 2023-11-20
Payer: COMMERCIAL

## 2023-11-20 ENCOUNTER — NURSE TRIAGE (OUTPATIENT)
Dept: OTHER | Facility: CLINIC | Age: 23
End: 2023-11-20

## 2023-11-20 ENCOUNTER — HOSPITAL ENCOUNTER (EMERGENCY)
Facility: HOSPITAL | Age: 23
Discharge: HOME | End: 2023-11-20
Attending: EMERGENCY MEDICINE
Payer: COMMERCIAL

## 2023-11-20 VITALS
SYSTOLIC BLOOD PRESSURE: 127 MMHG | DIASTOLIC BLOOD PRESSURE: 86 MMHG | TEMPERATURE: 98.7 F | HEART RATE: 91 BPM | OXYGEN SATURATION: 99 % | WEIGHT: 269.84 LBS | BODY MASS INDEX: 34.65 KG/M2 | RESPIRATION RATE: 20 BRPM

## 2023-11-20 DIAGNOSIS — R11.2 NAUSEA AND VOMITING, UNSPECIFIED VOMITING TYPE: Primary | ICD-10-CM

## 2023-11-20 DIAGNOSIS — R10.13 ABDOMINAL PAIN, EPIGASTRIC: ICD-10-CM

## 2023-11-20 LAB
ALBUMIN SERPL-MCNC: 3.9 G/DL (ref 3.5–5)
ALBUMIN/GLOB SERPL: 1.1 (ref 1.1–2.2)
ALP SERPL-CCNC: 73 U/L (ref 45–117)
ALT SERPL-CCNC: 49 U/L (ref 12–78)
ANION GAP SERPL CALC-SCNC: 4 MMOL/L (ref 5–15)
AST SERPL-CCNC: 21 U/L (ref 15–37)
BASOPHILS # BLD: 0.1 K/UL (ref 0–0.1)
BASOPHILS NFR BLD: 1 % (ref 0–1)
BILIRUB SERPL-MCNC: 0.6 MG/DL (ref 0.2–1)
BUN SERPL-MCNC: 9 MG/DL (ref 6–20)
BUN/CREAT SERPL: 10 (ref 12–20)
CALCIUM SERPL-MCNC: 9.2 MG/DL (ref 8.5–10.1)
CHLORIDE SERPL-SCNC: 106 MMOL/L (ref 97–108)
CO2 SERPL-SCNC: 28 MMOL/L (ref 21–32)
COMMENT:: NORMAL
CREAT SERPL-MCNC: 0.86 MG/DL (ref 0.7–1.3)
DIFFERENTIAL METHOD BLD: NORMAL
EOSINOPHIL # BLD: 0.4 K/UL (ref 0–0.4)
EOSINOPHIL NFR BLD: 6 % (ref 0–7)
ERYTHROCYTE [DISTWIDTH] IN BLOOD BY AUTOMATED COUNT: 11.9 % (ref 11.5–14.5)
GLOBULIN SER CALC-MCNC: 3.6 G/DL (ref 2–4)
GLUCOSE SERPL-MCNC: 123 MG/DL (ref 65–100)
HCT VFR BLD AUTO: 45.1 % (ref 36.6–50.3)
HEMOCCULT STL QL: NEGATIVE
HGB BLD-MCNC: 15.5 G/DL (ref 12.1–17)
IMM GRANULOCYTES # BLD AUTO: 0 K/UL (ref 0–0.04)
IMM GRANULOCYTES NFR BLD AUTO: 0 % (ref 0–0.5)
LIPASE SERPL-CCNC: 29 U/L (ref 13–75)
LYMPHOCYTES # BLD: 2.2 K/UL (ref 0.8–3.5)
LYMPHOCYTES NFR BLD: 30 % (ref 12–49)
MAGNESIUM SERPL-MCNC: 2.3 MG/DL (ref 1.6–2.4)
MCH RBC QN AUTO: 29.7 PG (ref 26–34)
MCHC RBC AUTO-ENTMCNC: 34.4 G/DL (ref 30–36.5)
MCV RBC AUTO: 86.4 FL (ref 80–99)
MONOCYTES # BLD: 0.4 K/UL (ref 0–1)
MONOCYTES NFR BLD: 6 % (ref 5–13)
NEUTS SEG # BLD: 4.1 K/UL (ref 1.8–8)
NEUTS SEG NFR BLD: 57 % (ref 32–75)
NRBC # BLD: 0 K/UL (ref 0–0.01)
NRBC BLD-RTO: 0 PER 100 WBC
PLATELET # BLD AUTO: 371 K/UL (ref 150–400)
PMV BLD AUTO: 9 FL (ref 8.9–12.9)
POTASSIUM SERPL-SCNC: 3.3 MMOL/L (ref 3.5–5.1)
PROT SERPL-MCNC: 7.5 G/DL (ref 6.4–8.2)
RBC # BLD AUTO: 5.22 M/UL (ref 4.1–5.7)
SODIUM SERPL-SCNC: 138 MMOL/L (ref 136–145)
SPECIMEN HOLD: NORMAL
WBC # BLD AUTO: 7.3 K/UL (ref 4.1–11.1)

## 2023-11-20 PROCEDURE — 80053 COMPREHEN METABOLIC PANEL: CPT

## 2023-11-20 PROCEDURE — 96374 THER/PROPH/DIAG INJ IV PUSH: CPT

## 2023-11-20 PROCEDURE — 36415 COLL VENOUS BLD VENIPUNCTURE: CPT

## 2023-11-20 PROCEDURE — 2580000003 HC RX 258

## 2023-11-20 PROCEDURE — A4216 STERILE WATER/SALINE, 10 ML: HCPCS

## 2023-11-20 PROCEDURE — 83690 ASSAY OF LIPASE: CPT

## 2023-11-20 PROCEDURE — 74178 CT ABD&PLV WO CNTR FLWD CNTR: CPT

## 2023-11-20 PROCEDURE — 6360000002 HC RX W HCPCS

## 2023-11-20 PROCEDURE — 85025 COMPLETE CBC W/AUTO DIFF WBC: CPT

## 2023-11-20 PROCEDURE — 82272 OCCULT BLD FECES 1-3 TESTS: CPT

## 2023-11-20 PROCEDURE — 83735 ASSAY OF MAGNESIUM: CPT

## 2023-11-20 PROCEDURE — 74170 CT ABD WO CNTRST FLWD CNTRST: CPT

## 2023-11-20 PROCEDURE — 99285 EMERGENCY DEPT VISIT HI MDM: CPT

## 2023-11-20 PROCEDURE — C9113 INJ PANTOPRAZOLE SODIUM, VIA: HCPCS

## 2023-11-20 PROCEDURE — 93005 ELECTROCARDIOGRAM TRACING: CPT

## 2023-11-20 PROCEDURE — 6360000004 HC RX CONTRAST MEDICATION

## 2023-11-20 RX ORDER — ONDANSETRON 4 MG/1
4 TABLET, ORALLY DISINTEGRATING ORAL 3 TIMES DAILY PRN
Qty: 21 TABLET | Refills: 0 | Status: SHIPPED | OUTPATIENT
Start: 2023-11-20

## 2023-11-20 RX ORDER — OMEPRAZOLE 20 MG/1
20 CAPSULE, DELAYED RELEASE ORAL
Qty: 30 CAPSULE | Refills: 0 | Status: SHIPPED | OUTPATIENT
Start: 2023-11-20

## 2023-11-20 RX ADMIN — IOPAMIDOL 100 ML: 755 INJECTION, SOLUTION INTRAVENOUS at 15:09

## 2023-11-20 RX ADMIN — SODIUM CHLORIDE 40 MG: 9 INJECTION INTRAMUSCULAR; INTRAVENOUS; SUBCUTANEOUS at 15:24

## 2023-11-20 ASSESSMENT — PAIN - FUNCTIONAL ASSESSMENT
PAIN_FUNCTIONAL_ASSESSMENT: NONE - DENIES PAIN
PAIN_FUNCTIONAL_ASSESSMENT: NONE - DENIES PAIN

## 2023-11-20 ASSESSMENT — ENCOUNTER SYMPTOMS
NAUSEA: 1
VOMITING: 1

## 2023-11-20 NOTE — DISCHARGE INSTRUCTIONS
You were seen today in the emergency department for nausea and vomiting. Your CT scan showed no acute abnormality. Your stool blood test was negative. Your blood work showed no evidence of anemia which is reassuring. I sent a prescription for nausea medicine and acid reducing medicine to your pharmacy. You can follow-up with your primary care provider within 1 week. Also given information to follow-up with GI specialist for ongoing management. You should return to the emergency department given your worsening symptoms.

## 2023-11-20 NOTE — ED TRIAGE NOTES
Pt c/o vomiting that started yesterday. Pt reports continuous vomiting from 8818-2715. Pt reports dark coffee ground looking vomit with bright red blood when wiping after a BM. Denies abd pain.

## 2023-11-20 NOTE — TELEPHONE ENCOUNTER
[place]    Received call from [facility] at [facility] with [employer]. Subjective: Caller states \"It's entirely digestion. Vomiting, diarrhea, chest pain with heart burn. \"     Current Symptoms: vomiting every 10-20 minutes yesterday-bile/black coffee ground material; vomiting today as well, diarrhea x 4-5 in 24 hours, heart burn in chest-worse with vomiting, decreased UOP    Hx of GERD and digestion issues    Onset: 1 day ago; worsening    Associated Symptoms: reduced activity, reduced appetite, reduced fluid intake, decreased urination    Pain Severity: 3/10; burning; constant    Temperature: Denies    What has been tried: sips of water    LMP: NA Pregnant: NA    Recommended disposition: Go to ED Now. Patient agreeable and will proceed there now. Care advice provided, patient verbalizes understanding; denies any other questions or concerns; instructed to call back for any new or worsening symptoms. Patient/caller agrees to proceed to [facility] Emergency Department    Attention Provider: Thank you for allowing me to participate in the care of your patient. The patient was connected to triage in response to information provided to the ECC/PSC. Please do not respond through this encounter as the response is not directed to a shared pool.       Reason for Disposition   Vomiting red blood or black (coffee ground) material    Protocols used: Vomiting-ADULT-OH

## 2023-11-20 NOTE — ED NOTES
Discharge instructions given to patient by provider. Patient ambulated out of ED with no signs/symptoms of apparent distress.      Jerrod Price LPN  48/27/86 4879

## 2023-11-20 NOTE — ED PROVIDER NOTES
management. REASSESSMENT     ED Course as of 11/20/23 1842 Mon Nov 20, 2023   1404 EKG shows sinus tachycardia rate of 102, normal intervals, normal axis, no STEMI. [RD]   1503 CBC with Auto Differential:    WBC 7.3   RBC 5.22   Hemoglobin Quant 15.5   Hematocrit 45.1   MCV 86.4   MCH 29.7   MCHC 34.4   RDW 11.9   Platelet Count 156   MPV 9.0   Nucleated Red Blood Cells 0.0   Nucleated Red Blood Cells 0.00   Neutrophils % 57   Lymphocyte % 30   Monocytes % 6   Eosinophils % 6   Basophils % 1   Immature Granulocytes 0   Neutrophils Absolute 4.1   Lymphocytes Absolute 2.2   Monocytes Absolute 0.4   Eosinophils Absolute 0.4   Basophils Absolute 0.1   Absolute Immature Granulocyte 0.0   Differential Type AUTOMATED  Normal  [TR]   1547 CT ABDOMEN W WO CONTRAST Additional Contrast? None  No acute abnormality  [TR]   1558 Occult Blood, Fecal:    POC Occult Blood, Fecal Negative  Negative   [TR]      ED Course User Index  [RD] Severa Kind, MD  [TR] Eugene Hoff PA-C           CONSULTS:  None    PROCEDURES:  Unless otherwise noted below, none     Procedures      FINAL IMPRESSION      1. Nausea and vomiting, unspecified vomiting type    2.  Abdominal pain, epigastric          DISPOSITION/PLAN   DISPOSITION Decision To Discharge 11/20/2023 04:32:59 PM      PATIENT REFERRED TO:  Melisa Vargas, [address] [place] [address]  [phone]    Go in 4 days      [facility] EMERGENCY [address] [facility]  [phone]  Go to   If symptoms worsen    Kristin Santos MD  [address]  [address] [place] [address]  [phone]    Schedule an appointment as soon as possible for a visit   GI      DISCHARGE MEDICATIONS:  Discharge Medication List as of 11/20/2023  4:33 PM        START taking these medications    Details   omeprazole (PRILOSEC) 20 MG delayed release capsule Take 1 capsule by mouth every morning (before breakfast), Disp-30 capsule, R-0Normal

## 2023-11-21 ENCOUNTER — CARE COORDINATION (OUTPATIENT)
Dept: OTHER | Facility: CLINIC | Age: 23
End: 2023-11-21

## 2023-11-21 LAB
EKG ATRIAL RATE: 102 BPM
EKG DIAGNOSIS: NORMAL
EKG P AXIS: 54 DEGREES
EKG P-R INTERVAL: 150 MS
EKG Q-T INTERVAL: 320 MS
EKG QRS DURATION: 80 MS
EKG QTC CALCULATION (BAZETT): 417 MS
EKG R AXIS: 89 DEGREES
EKG T AXIS: 28 DEGREES
EKG VENTRICULAR RATE: 102 BPM

## 2023-11-21 PROCEDURE — 93010 ELECTROCARDIOGRAM REPORT: CPT | Performed by: INTERNAL MEDICINE

## 2023-11-21 NOTE — CARE COORDINATION
Ambulatory Care Coordination Note    LPN CC attempted to reach patient for introduction to Associate Care Management related to ED Visit - [facility]. HIPAA compliant message left requesting a return phone call at patient convenience. Plan for follow-up call in 1-2 days      No future appointments. omeprazole 20 MG delayed release capsule  ondansetron 4 MG disintegrating tablet    Call your doctor now or seek immediate medical care if:  You have new or worse belly pain. You are vomiting. You have new or worse symptoms of indigestion. You have trouble or pain swallowing. You are losing weight. You do not get better as expected.

## 2023-11-22 ENCOUNTER — CARE COORDINATION (OUTPATIENT)
Dept: OTHER | Facility: CLINIC | Age: 23
End: 2023-11-22

## 2023-11-22 NOTE — CARE COORDINATION
Ambulatory Care Coordination Note    LPN CC attempted 2nd outreach to patient for introduction to Associate Care Management related to ED Visit - [facility]. [employer] Painting HIPAA compliant message left requesting a return phone call at patient convenience. Unable to Reach Letter sent to patient via [employer]. Will continue to outreach. No future appointments.

## 2023-12-13 ENCOUNTER — CARE COORDINATION (OUTPATIENT)
Dept: OTHER | Facility: CLINIC | Age: 23
End: 2023-12-13

## 2023-12-29 ENCOUNTER — APPOINTMENT (OUTPATIENT)
Facility: HOSPITAL | Age: 23
End: 2023-12-29
Payer: COMMERCIAL

## 2023-12-29 ENCOUNTER — HOSPITAL ENCOUNTER (EMERGENCY)
Facility: HOSPITAL | Age: 23
Discharge: HOME | End: 2023-12-29
Attending: STUDENT IN AN ORGANIZED HEALTH CARE EDUCATION/TRAINING PROGRAM
Payer: COMMERCIAL

## 2023-12-29 VITALS
DIASTOLIC BLOOD PRESSURE: 78 MMHG | TEMPERATURE: 98.7 F | HEART RATE: 91 BPM | RESPIRATION RATE: 18 BRPM | SYSTOLIC BLOOD PRESSURE: 115 MMHG | OXYGEN SATURATION: 97 %

## 2023-12-29 DIAGNOSIS — R07.9 CHEST PAIN, UNSPECIFIED TYPE: ICD-10-CM

## 2023-12-29 DIAGNOSIS — Z82.49 FAMILY HX OF HYPERTENSION: Primary | ICD-10-CM

## 2023-12-29 LAB
ALBUMIN SERPL-MCNC: 3.9 G/DL (ref 3.5–5)
ALBUMIN/GLOB SERPL: 1 (ref 1.1–2.2)
ALP SERPL-CCNC: 78 U/L (ref 45–117)
ALT SERPL-CCNC: 61 U/L (ref 12–78)
ANION GAP SERPL CALC-SCNC: 5 MMOL/L (ref 5–15)
AST SERPL-CCNC: 27 U/L (ref 15–37)
BASOPHILS # BLD: 0.1 K/UL (ref 0–0.1)
BASOPHILS NFR BLD: 1 % (ref 0–1)
BILIRUB SERPL-MCNC: 0.5 MG/DL (ref 0.2–1)
BUN SERPL-MCNC: 8 MG/DL (ref 6–20)
BUN/CREAT SERPL: 9 (ref 12–20)
CALCIUM SERPL-MCNC: 9.4 MG/DL (ref 8.5–10.1)
CHLORIDE SERPL-SCNC: 106 MMOL/L (ref 97–108)
CO2 SERPL-SCNC: 27 MMOL/L (ref 21–32)
COMMENT:: NORMAL
CREAT SERPL-MCNC: 0.88 MG/DL (ref 0.7–1.3)
DIFFERENTIAL METHOD BLD: ABNORMAL
EKG ATRIAL RATE: 99 BPM
EKG DIAGNOSIS: NORMAL
EKG P AXIS: 57 DEGREES
EKG P-R INTERVAL: 146 MS
EKG Q-T INTERVAL: 322 MS
EKG QRS DURATION: 80 MS
EKG QTC CALCULATION (BAZETT): 413 MS
EKG R AXIS: 94 DEGREES
EKG T AXIS: 13 DEGREES
EKG VENTRICULAR RATE: 99 BPM
EOSINOPHIL # BLD: 0.3 K/UL (ref 0–0.4)
EOSINOPHIL NFR BLD: 4 % (ref 0–7)
ERYTHROCYTE [DISTWIDTH] IN BLOOD BY AUTOMATED COUNT: 11.8 % (ref 11.5–14.5)
GLOBULIN SER CALC-MCNC: 4 G/DL (ref 2–4)
GLUCOSE SERPL-MCNC: 90 MG/DL (ref 65–100)
HCT VFR BLD AUTO: 46.2 % (ref 36.6–50.3)
HGB BLD-MCNC: 15.9 G/DL (ref 12.1–17)
IMM GRANULOCYTES # BLD AUTO: 0 K/UL (ref 0–0.04)
IMM GRANULOCYTES NFR BLD AUTO: 0 % (ref 0–0.5)
LYMPHOCYTES # BLD: 2.7 K/UL (ref 0.8–3.5)
LYMPHOCYTES NFR BLD: 32 % (ref 12–49)
MCH RBC QN AUTO: 29.6 PG (ref 26–34)
MCHC RBC AUTO-ENTMCNC: 34.4 G/DL (ref 30–36.5)
MCV RBC AUTO: 85.9 FL (ref 80–99)
MONOCYTES # BLD: 0.6 K/UL (ref 0–1)
MONOCYTES NFR BLD: 8 % (ref 5–13)
NEUTS SEG # BLD: 4.5 K/UL (ref 1.8–8)
NEUTS SEG NFR BLD: 55 % (ref 32–75)
NRBC # BLD: 0 K/UL (ref 0–0.01)
NRBC BLD-RTO: 0 PER 100 WBC
PLATELET # BLD AUTO: 403 K/UL (ref 150–400)
PMV BLD AUTO: 8.8 FL (ref 8.9–12.9)
POTASSIUM SERPL-SCNC: 3.9 MMOL/L (ref 3.5–5.1)
PROT SERPL-MCNC: 7.9 G/DL (ref 6.4–8.2)
RBC # BLD AUTO: 5.38 M/UL (ref 4.1–5.7)
SODIUM SERPL-SCNC: 138 MMOL/L (ref 136–145)
SPECIMEN HOLD: NORMAL
TROPONIN I SERPL HS-MCNC: 16 NG/L (ref 0–76)
WBC # BLD AUTO: 8.2 K/UL (ref 4.1–11.1)

## 2023-12-29 PROCEDURE — 84484 ASSAY OF TROPONIN QUANT: CPT

## 2023-12-29 PROCEDURE — 85025 COMPLETE CBC W/AUTO DIFF WBC: CPT

## 2023-12-29 PROCEDURE — 71046 X-RAY EXAM CHEST 2 VIEWS: CPT

## 2023-12-29 PROCEDURE — 93010 ELECTROCARDIOGRAM REPORT: CPT | Performed by: INTERNAL MEDICINE

## 2023-12-29 PROCEDURE — 80053 COMPREHEN METABOLIC PANEL: CPT

## 2023-12-29 PROCEDURE — 36415 COLL VENOUS BLD VENIPUNCTURE: CPT

## 2023-12-29 PROCEDURE — 99285 EMERGENCY DEPT VISIT HI MDM: CPT

## 2023-12-29 PROCEDURE — 93005 ELECTROCARDIOGRAM TRACING: CPT | Performed by: NURSE PRACTITIONER

## 2023-12-29 RX ORDER — KETOROLAC TROMETHAMINE 30 MG/ML
30 INJECTION, SOLUTION INTRAMUSCULAR; INTRAVENOUS
Status: DISCONTINUED | OUTPATIENT
Start: 2023-12-29 | End: 2023-12-29 | Stop reason: HOSPADM

## 2023-12-29 RX ORDER — 0.9 % SODIUM CHLORIDE 0.9 %
1000 INTRAVENOUS SOLUTION INTRAVENOUS ONCE
Status: DISCONTINUED | OUTPATIENT
Start: 2023-12-29 | End: 2023-12-29

## 2023-12-29 ASSESSMENT — HEART SCORE: ECG: 0

## 2023-12-29 ASSESSMENT — PAIN - FUNCTIONAL ASSESSMENT: PAIN_FUNCTIONAL_ASSESSMENT: NONE - DENIES PAIN

## 2023-12-29 NOTE — ED TRIAGE NOTES
Triage: Pt arrives ambulatory from home with CC of hypertension. He reports he was at home and had the sensation of pulsation in his fingers and chest pain. He took his blood pressure and he had a systolic of 905. Pt reports after sitting for some time his symptoms are substantially improved.

## 2023-12-29 NOTE — ED PROVIDER NOTES
[facility] [place] EMERGENCY DEP  EMERGENCY DEPARTMENT ENCOUNTER      Pt Name: Tara Benson  MRN: [id number]  [address] [facility] [place] 2000  Date of evaluation: 12/29/2023  Provider: CRYSTAL Vasquez - JAGDISH    CHIEF COMPLAINT       Chief Complaint   Patient presents with    Hypertension         HISTORY OF PRESENT ILLNESS   (Location/Symptom, Timing/Onset, Context/Setting, Quality, Duration, Modifying Factors, Severity)  Note limiting factors. 59-year-old male with past medical history of ADHD, anxiety, autism spectrum disorder presents ambulatory with his mother who also states that the patient's primary care provider is also tested him for bipolar in which it has not been officially diagnosed at this time 4. Patient currently is taking lithium, states that he woke up this morning, at approximately 10 AM this morning and started feeling Dizzy, chills, CP-nonradiating that feels like pressure, pulsing feeling in both fingers and arms. Patient states that he was seen at a care now facility prior to arrival, and was told that he had an elevated heart rate and was orthostatic. Patient states that he has a family history of high blood pressure but has never been diagnosed himself. Patient denies any fever, chills, cough, shortness of breath, abdominal pain to include nausea, vomiting or diarrhea, flank pain, urinary frequency or dysuria. The history is provided by the patient. Review of External Medical Records:     Nursing Notes were reviewed. REVIEW OF SYSTEMS    (2-9 systems for level 4, 10 or more for level 5)     Review of Systems    Except as noted above the remainder of the review of systems was reviewed and negative.        PAST MEDICAL HISTORY     Past Medical History:   Diagnosis Date    ADHD (attention deficit hyperactivity disorder)     Anxiety     Autism spectrum disorder          SURGICAL HISTORY       Past Surgical History:   Procedure Laterality Date    HEENT      wisdom teeth    OTHER care.      Amount and/or Complexity of Data Reviewed  Labs: ordered. Decision-making details documented in ED Course.  Radiology: ordered. Decision-making details documented in ED Course.  ECG/medicine tests: ordered.    Risk  Prescription drug management.            REASSESSMENT     ED Course as of 12/29/23 1917   Fri Dec 29, 2023   1724 EKG rate 99bpm, sinus rhythm, nonspecific St changes, no STEMI [MG]   1848 Troponin:    Troponin, High Sensitivity 16  16 [AF]   1848 CMP(!):    Sodium 138   Potassium 3.9   Chloride 106   CO2 27   Anion Gap 5   Glucose, Random 90   BUN,BUNPL 8   Creatinine 0.88   Bun/Cre Ratio 9(!)   Est, Glom Filt Rate >60   CALCIUM, SERUM, 371073 9.4   BILIRUBIN TOTAL 0.5   ALT 61   AST 27   Alk Phos 78   Total Protein 7.9   Albumin 3.9   Globulin 4.0   ALBUMIN/GLOBULIN RATIO 1.0(!) [AF]   1848 CBC with Auto Differential(!):    WBC 8.2   RBC 5.38   Hemoglobin Quant 15.9   Hematocrit 46.2   MCV 85.9   MCH 29.6   MCHC 34.4   RDW 11.8   Platelet Count 403(!)   MPV 8.8(!)   Nucleated Red Blood Cells 0.0   Nucleated Red Blood Cells 0.00   Neutrophils % 55   Lymphocyte % 32   Monocytes % 8   Eosinophils % 4   Basophils % 1   Immature Granulocytes 0   Neutrophils Absolute 4.5   Lymphocytes Absolute 2.7   Monocytes Absolute 0.6   Eosinophils Absolute 0.3   Basophils Absolute 0.1   Absolute Immature Granulocyte 0.0   Differential Type AUTOMATED [AF]   1848 XR CHEST (2 VW)  No acute cardiopulmonary disease. [AF]      ED Course User Index  [AF] Kelsea Mullins, APRN - NP  [MG] Leyda Stewart DO   Patient reevaluated denies any pain at this time, patient's mother is present, vital signs reassessed heart rate much improved, blood pressure is normal 115/78.  Mother thinking that this is most likely just a panic attack.  Lab work today is reassuring for no acute cardiology origin, discussed following up with primary care provider and returning to the ER with worsening of symptoms.  Patient and mother

## 2023-12-30 NOTE — ED NOTES
Discharge instructions given to patient by provider. Patient ambulated out of ED in no apparent distress.

## 2024-03-19 ENCOUNTER — TELEPHONE (OUTPATIENT)
Age: 24
End: 2024-03-19

## 2024-03-28 ENCOUNTER — OFFICE VISIT (OUTPATIENT)
Age: 24
End: 2024-03-28
Payer: COMMERCIAL

## 2024-03-28 VITALS
SYSTOLIC BLOOD PRESSURE: 126 MMHG | OXYGEN SATURATION: 96 % | TEMPERATURE: 98.2 F | BODY MASS INDEX: 34.74 KG/M2 | HEART RATE: 125 BPM | HEIGHT: 74 IN | WEIGHT: 270.7 LBS | DIASTOLIC BLOOD PRESSURE: 74 MMHG

## 2024-03-28 DIAGNOSIS — G47.10 HYPERSOMNIA, UNSPECIFIED: ICD-10-CM

## 2024-03-28 DIAGNOSIS — G47.31 COMPLEX SLEEP APNEA SYNDROME: Primary | ICD-10-CM

## 2024-03-28 PROCEDURE — 99203 OFFICE O/P NEW LOW 30 MIN: CPT | Performed by: INTERNAL MEDICINE

## 2024-03-28 RX ORDER — ARIPIPRAZOLE 15 MG/1
15 TABLET ORAL DAILY
COMMUNITY
Start: 2024-03-22

## 2024-03-28 RX ORDER — TRAZODONE HYDROCHLORIDE 100 MG/1
100 TABLET ORAL NIGHTLY
COMMUNITY
Start: 2024-03-22

## 2024-03-28 RX ORDER — PROPRANOLOL HYDROCHLORIDE 20 MG/1
TABLET ORAL
COMMUNITY
Start: 2024-01-12

## 2024-03-28 RX ORDER — PROMETHAZINE HYDROCHLORIDE 25 MG/1
TABLET ORAL
COMMUNITY
Start: 2024-01-12

## 2024-03-28 ASSESSMENT — SLEEP AND FATIGUE QUESTIONNAIRES
HOW LIKELY ARE YOU TO NOD OFF OR FALL ASLEEP IN A CAR, WHILE STOPPED FOR A FEW MINUTES IN TRAFFIC: WOULD NEVER DOZE
HOW LIKELY ARE YOU TO NOD OFF OR FALL ASLEEP WHILE WATCHING TV: WOULD NEVER DOZE
ESS TOTAL SCORE: 2
HOW LIKELY ARE YOU TO NOD OFF OR FALL ASLEEP WHILE SITTING AND TALKING TO SOMEONE: WOULD NEVER DOZE
HOW LIKELY ARE YOU TO NOD OFF OR FALL ASLEEP WHILE SITTING INACTIVE IN A PUBLIC PLACE: WOULD NEVER DOZE
HOW LIKELY ARE YOU TO NOD OFF OR FALL ASLEEP WHILE LYING DOWN TO REST IN THE AFTERNOON WHEN CIRCUMSTANCES PERMIT: SLIGHT CHANCE OF DOZING
HOW LIKELY ARE YOU TO NOD OFF OR FALL ASLEEP WHILE SITTING QUIETLY AFTER LUNCH WITHOUT ALCOHOL: WOULD NEVER DOZE
HOW LIKELY ARE YOU TO NOD OFF OR FALL ASLEEP WHEN YOU ARE A PASSENGER IN A CAR FOR AN HOUR WITHOUT A BREAK: WOULD NEVER DOZE
HOW LIKELY ARE YOU TO NOD OFF OR FALL ASLEEP WHILE SITTING AND READING: SLIGHT CHANCE OF DOZING

## 2024-03-28 NOTE — PROGRESS NOTES
[address], [place] [address]  Tel.  [phone]    Fax. [phone]     [address], [place] [address]  Tel.  [phone]    Fax. [phone] [address] [place] [address]  Tel.  [phone]    Fax. [phone]       Mikhail Pittman is a 23 y.o. year old male seen for evaluation of a sleep disorder.       ASSESSMENT/PLAN:     Diagnosis Orders   1. Complex sleep apnea syndrome  SLEEP STUDY FULL      2. Hypersomnia, unspecified  12-Drug Screen W/Conf, Urine      3. BMI 34.0-34.9,adult            Patient has a history and examination consistent with the diagnosis of sleep apnea.    Return for follow-up after testing is completed.    * Sleep testing was ordered for re-evaluation.      Orders Placed This Encounter   Procedures    12-Drug Screen W/Conf, Urine     Standing Status:   Future     Standing Expiration Date:   3/28/2025    SLEEP STUDY FULL     Standing Status:   Future     Standing Expiration Date:   3/28/2025       * He was provided information on sleep apnea including corresponding risk factors and the importance of proper treatment.     * Treatment options were reviewed in detail, he would like to proceed with OAT therapy and will be referred for Hypoglossal Nerve Stimulation Therapy if the disorder is predominantly Central (would also get a neurology / cardiology evaluation at that time).     * The patient was counseled regarding proper sleep hygiene, with emphasis on ensuring sufficient total sleep time; safe driving and the benefits of exercise and weight loss.      * All of his questions were addressed.    2. Hypersomnia, unspecified - patient denies of substance abuse, hypersomia may be due to mood disorder or untreated sleep apnea.    3. Recommended a dedicated weight loss program through appropriate diet and exercise regimen as significant weight reduction has been

## 2024-03-29 LAB
AMPHETAMINES UR QL SCN: NEGATIVE NG/ML
BARBITURATES UR QL: NEGATIVE NG/ML
BENZODIAZ UR QL SCN: NEGATIVE NG/ML
BZE UR QL: NEGATIVE NG/ML
CANNABINOIDS UR QL SCN: NEGATIVE NG/ML
CREAT UR-MCNC: 210.8 MG/DL (ref 20–300)
ETHANOL UR-MCNC: NEGATIVE %
MEPERIDINE UR QL: NEGATIVE NG/ML
METHADONE UR QL: NEGATIVE NG/ML
OPIATES UR QL SCN: NEGATIVE NG/ML
OXYCODONE+OXYMORPHONE UR QL SCN: NEGATIVE NG/ML
PCP UR QL: NEGATIVE NG/ML
PROPOXYPH UR QL: NEGATIVE NG/ML
SPECIMEN STATUS REPORT: NORMAL

## 2024-04-03 ENCOUNTER — TELEPHONE (OUTPATIENT)
Age: 24
End: 2024-04-03

## 2024-04-03 NOTE — TELEPHONE ENCOUNTER
[facility] Urine Drug Screening Results from 3/28/2024 scanned into patient media.

## 2024-05-23 ENCOUNTER — HOSPITAL ENCOUNTER (OUTPATIENT)
Facility: HOSPITAL | Age: 24
Discharge: HOME | End: 2024-05-23
Payer: COMMERCIAL

## 2024-05-23 VITALS
TEMPERATURE: 97.5 F | SYSTOLIC BLOOD PRESSURE: 140 MMHG | WEIGHT: 270 LBS | HEIGHT: 74 IN | BODY MASS INDEX: 34.65 KG/M2 | DIASTOLIC BLOOD PRESSURE: 86 MMHG | HEART RATE: 92 BPM | OXYGEN SATURATION: 94 %

## 2024-05-23 DIAGNOSIS — G47.31 COMPLEX SLEEP APNEA SYNDROME: ICD-10-CM

## 2024-05-23 PROCEDURE — 95810 POLYSOM 6/> YRS 4/> PARAM: CPT | Performed by: INTERNAL MEDICINE

## 2024-06-07 ENCOUNTER — TELEPHONE (OUTPATIENT)
Age: 24
End: 2024-06-07

## 2024-06-07 ENCOUNTER — CLINICAL DOCUMENTATION (OUTPATIENT)
Age: 24
End: 2024-06-07

## 2024-06-07 DIAGNOSIS — G47.33 OSA (OBSTRUCTIVE SLEEP APNEA): Primary | ICD-10-CM

## 2024-06-07 NOTE — PROGRESS NOTES
Mikhail Pittman / parent is to be contacted by sleep technologists regarding results of Sleep Testing which was indicative of an average AHI of 25.4 per hour with an SpO2 michael of 84% and SpO2 of < 88% being 3.10 minutes.       * Treatment options were offered at initial visit. Patient / parent had elected to have an EENT referral to discuss surgical options in the presence of obstructive sleep apnea.    * We have referred the patient to Otolaryngology for upper airway evaluation.     Follow-up office visit and re-testing is recommended in 3-6 months to assess efficacy of therapy.     Encounter Diagnosis   Name Primary?    FLORENTINO (obstructive sleep apnea) Yes       Orders Placed This Encounter   Procedures    Ambulatory referral to ENT     Referral Priority:   Routine     Referral Type:   Eval and Treat     Referral Reason:   Specialty Services Required     Referred to Provider:   Fercho Stockton MD     Requested Specialty:   Otolaryngology     Number of Visits Requested:   1

## 2024-06-11 ENCOUNTER — CLINICAL DOCUMENTATION (OUTPATIENT)
Age: 24
End: 2024-06-11

## 2024-06-11 NOTE — TELEPHONE ENCOUNTER
Called patient to inform about the ENT Referral. Faxed Referral to ENT _ Dr. Fercho Stockton MD _done

## 2024-06-13 ENCOUNTER — TELEPHONE (OUTPATIENT)
Age: 24
End: 2024-06-13

## 2024-11-12 ENCOUNTER — OFFICE VISIT (OUTPATIENT)
Age: 24
End: 2024-11-12
Payer: COMMERCIAL

## 2024-11-12 VITALS
HEIGHT: 74 IN | HEART RATE: 70 BPM | SYSTOLIC BLOOD PRESSURE: 122 MMHG | WEIGHT: 278.8 LBS | BODY MASS INDEX: 35.78 KG/M2 | OXYGEN SATURATION: 98 % | DIASTOLIC BLOOD PRESSURE: 80 MMHG | RESPIRATION RATE: 18 BRPM

## 2024-11-12 DIAGNOSIS — F90.2 ATTENTION DEFICIT HYPERACTIVITY DISORDER (ADHD), COMBINED TYPE: ICD-10-CM

## 2024-11-12 DIAGNOSIS — F31.9 BIPOLAR DISORDER WITH DEPRESSION (HCC): ICD-10-CM

## 2024-11-12 DIAGNOSIS — G47.33 OSA (OBSTRUCTIVE SLEEP APNEA): ICD-10-CM

## 2024-11-12 DIAGNOSIS — F15.20 CAFFEINE DEPENDENCE (HCC): ICD-10-CM

## 2024-11-12 DIAGNOSIS — J31.0 CHRONIC RHINITIS: ICD-10-CM

## 2024-11-12 DIAGNOSIS — J35.3 ADENOTONSILLAR HYPERTROPHY: Primary | ICD-10-CM

## 2024-11-12 PROCEDURE — 99204 OFFICE O/P NEW MOD 45 MIN: CPT | Performed by: OTOLARYNGOLOGY

## 2024-11-12 RX ORDER — CLONAZEPAM 0.5 MG/1
TABLET ORAL
COMMUNITY
Start: 2024-10-17

## 2024-11-12 ASSESSMENT — ENCOUNTER SYMPTOMS
SHORTNESS OF BREATH: 0
ABDOMINAL PAIN: 0
VOICE CHANGE: 0
STRIDOR: 0
SINUS PRESSURE: 0
EYE ITCHING: 0
TROUBLE SWALLOWING: 0
VOMITING: 0
SORE THROAT: 0
NAUSEA: 0
CHOKING: 0
EYE DISCHARGE: 0
SINUS PAIN: 0
RHINORRHEA: 0
APNEA: 1
COUGH: 0
BACK PAIN: 0

## 2024-11-12 ASSESSMENT — PATIENT HEALTH QUESTIONNAIRE - PHQ9
SUM OF ALL RESPONSES TO PHQ9 QUESTIONS 1 & 2: 3
SUM OF ALL RESPONSES TO PHQ QUESTIONS 1-9: 15
9. THOUGHTS THAT YOU WOULD BE BETTER OFF DEAD, OR OF HURTING YOURSELF: NOT AT ALL
SUM OF ALL RESPONSES TO PHQ QUESTIONS 1-9: 15
SUM OF ALL RESPONSES TO PHQ QUESTIONS 1-9: 15
3. TROUBLE FALLING OR STAYING ASLEEP: NEARLY EVERY DAY
7. TROUBLE CONCENTRATING ON THINGS, SUCH AS READING THE NEWSPAPER OR WATCHING TELEVISION: MORE THAN HALF THE DAYS
6. FEELING BAD ABOUT YOURSELF - OR THAT YOU ARE A FAILURE OR HAVE LET YOURSELF OR YOUR FAMILY DOWN: NOT AT ALL
2. FEELING DOWN, DEPRESSED OR HOPELESS: NOT AT ALL
4. FEELING TIRED OR HAVING LITTLE ENERGY: NEARLY EVERY DAY
8. MOVING OR SPEAKING SO SLOWLY THAT OTHER PEOPLE COULD HAVE NOTICED. OR THE OPPOSITE, BEING SO FIGETY OR RESTLESS THAT YOU HAVE BEEN MOVING AROUND A LOT MORE THAN USUAL: SEVERAL DAYS
5. POOR APPETITE OR OVEREATING: NEARLY EVERY DAY
1. LITTLE INTEREST OR PLEASURE IN DOING THINGS: NEARLY EVERY DAY
SUM OF ALL RESPONSES TO PHQ QUESTIONS 1-9: 15
10. IF YOU CHECKED OFF ANY PROBLEMS, HOW DIFFICULT HAVE THESE PROBLEMS MADE IT FOR YOU TO DO YOUR WORK, TAKE CARE OF THINGS AT HOME, OR GET ALONG WITH OTHER PEOPLE: SOMEWHAT DIFFICULT

## 2024-11-12 NOTE — PROGRESS NOTES
Subjective:    Mikhail Pittman   24 y.o.   2000     New Patient Visit    Chief Complaint   Patient presents with    New Patient     obstructive sleep apnea      History of Present Illness:  11/12/2024-[place] office  24-year-old male presents with FLORENTINO; he was initially dx with mostly central sleep apnea a few years ago, tried CPAP at that time and was unable to tolerate it.  He was still having fatigue and sleep issues so had a more updated study done recently.  He is unsure if he snores.  He does have a hard time getting to sleep, he had tried sleep aids but did not like how it kept him more drowsy during the day.  No prior upper airway surgery, but has had nasal cauterizations.  Patient is on multiple psychiatric medications.  Has had longstanding sleep issues even since a child.    Review of Systems  Review of Systems   Constitutional:  Negative for activity change, appetite change, chills, fatigue and fever.   HENT:  Negative for congestion, ear discharge, ear pain, mouth sores, nosebleeds, postnasal drip, rhinorrhea, sinus pressure, sinus pain, sneezing, sore throat, tinnitus, trouble swallowing and voice change.    Eyes:  Negative for discharge, itching and visual disturbance.   Respiratory:  Positive for apnea. Negative for cough, choking, shortness of breath and stridor.    Cardiovascular:  Negative for chest pain and palpitations.   Gastrointestinal:  Negative for abdominal pain, nausea and vomiting.   Endocrine: Negative for cold intolerance and heat intolerance.   Genitourinary:  Negative for difficulty urinating and dysuria.   Musculoskeletal:  Negative for arthralgias, back pain, gait problem, myalgias, neck pain and neck stiffness.   Skin:  Negative for rash and wound.   Allergic/Immunologic: Positive for environmental allergies. Negative for food allergies.   Neurological:  Negative for dizziness, speech difficulty, light-headedness and headaches.   Hematological:  Negative for adenopathy.

## 2024-11-12 NOTE — PROGRESS NOTES
Identified pt with two pt identifiers(name and ). Reviewed record in preparation for visit and have obtained necessary documentation. All patient medications has been reviewed.  Chief Complaint   Patient presents with    New Patient     obstructive sleep apnea       Vitals:    24 1052   BP: 122/80   Resp: 18                   Coordination of Care Questionnaire:   1) Have you been to an emergency room, urgent care, or hospitalized since your last visit?   no       2. Have seen or consulted any other health care provider since your last visit? no        Patient is accompanied by self I have received verbal consent from Mikhail Pittman to discuss any/all medical information while they are present in the room.

## 2024-11-18 ENCOUNTER — TELEPHONE (OUTPATIENT)
Age: 24
End: 2024-11-18

## 2024-11-18 NOTE — TELEPHONE ENCOUNTER
Called patient to get scheduled for surgery. Patient stated that he doesn't want to schedule just yet, and will call back when he's ready to schedule.